# Patient Record
Sex: MALE | Race: WHITE | Employment: OTHER | URBAN - METROPOLITAN AREA
[De-identification: names, ages, dates, MRNs, and addresses within clinical notes are randomized per-mention and may not be internally consistent; named-entity substitution may affect disease eponyms.]

---

## 2023-05-26 ENCOUNTER — OFFICE VISIT (OUTPATIENT)
Dept: ORTHOPEDIC SURGERY | Age: 58
End: 2023-05-26

## 2023-05-26 VITALS
SYSTOLIC BLOOD PRESSURE: 126 MMHG | BODY MASS INDEX: 30.48 KG/M2 | OXYGEN SATURATION: 98 % | DIASTOLIC BLOOD PRESSURE: 72 MMHG | HEART RATE: 70 BPM | TEMPERATURE: 98.6 F | HEIGHT: 72 IN | WEIGHT: 225 LBS

## 2023-05-26 DIAGNOSIS — Z96.651 PAIN DUE TO TOTAL RIGHT KNEE REPLACEMENT, INITIAL ENCOUNTER (HCC): ICD-10-CM

## 2023-05-26 DIAGNOSIS — M17.11 PRIMARY OSTEOARTHRITIS OF RIGHT KNEE: Primary | ICD-10-CM

## 2023-05-26 DIAGNOSIS — T84.84XA PAIN DUE TO TOTAL RIGHT KNEE REPLACEMENT, INITIAL ENCOUNTER (HCC): Primary | ICD-10-CM

## 2023-05-26 DIAGNOSIS — Z96.651 PAIN DUE TO TOTAL RIGHT KNEE REPLACEMENT, INITIAL ENCOUNTER (HCC): Primary | ICD-10-CM

## 2023-05-26 DIAGNOSIS — T84.84XA PAIN DUE TO TOTAL RIGHT KNEE REPLACEMENT, INITIAL ENCOUNTER (HCC): ICD-10-CM

## 2023-05-26 LAB
CRP SERPL HS-MCNC: <3 MG/L (ref 0–5)
ERYTHROCYTE [SEDIMENTATION RATE] IN BLOOD BY WESTERGREN METHOD: 2 MM (ref 0–20)

## 2023-05-26 ASSESSMENT — ENCOUNTER SYMPTOMS
CONSTIPATION: 0
EYE ITCHING: 0
COUGH: 0
EYE PAIN: 0
DIARRHEA: 0
ABDOMINAL PAIN: 0
SHORTNESS OF BREATH: 0
EYE DISCHARGE: 0

## 2023-05-26 NOTE — PROGRESS NOTES
pedis palpable and 2+    Radiographs:  Radiographs of the right knee were personally reviewed, AP, lateral and sunrise and they revealed: The patient has a Carina press-fit total knee in place. It appears to be well fixed and well aligned. There is some slight notching of the anterior femoral cortex. There is some bony overgrowth around the patellar component noted on the sunrise view. There are no fractures. There are no intra-articular loose bodies nor destructive bony lesions noted. MRI: None    Diagnosis:   Diagnosis Orders   1. Pain due to total right knee replacement, initial encounter [facility])            Plan:    The patient has significant instability of the knee. He has severe pain with varus and valgus stressing, especially with releasing of it when the joint clunks back into place. The radiographs do not show any allison evidence of loosening. Concern would be for infection and/or loosening of the implants which would not readily be evident on the plain films. In order to get him some relief, we will get a get him a hinged knee brace. We then going to send him for a sed rate and a CRP to rule out infection. We will send him for a bone scan to evaluate for loosening. Follow-up to discuss findings. No orders of the defined types were placed in this encounter. No orders of the defined types were placed in this encounter. No follow-ups on file.     Emil Brower MD

## 2023-06-06 ENCOUNTER — HOSPITAL ENCOUNTER (OUTPATIENT)
Dept: NUCLEAR MEDICINE | Age: 58
Discharge: HOME | End: 2023-06-08
Attending: ORTHOPAEDIC SURGERY
Payer: COMMERCIAL

## 2023-06-06 PROCEDURE — 78315 BONE IMAGING 3 PHASE: CPT

## 2023-06-06 PROCEDURE — 3430000000 HC RX DIAGNOSTIC RADIOPHARMACEUTICAL: Performed by: ORTHOPAEDIC SURGERY

## 2023-06-06 PROCEDURE — A9503 TC99M MEDRONATE: HCPCS | Performed by: ORTHOPAEDIC SURGERY

## 2023-06-06 RX ORDER — TC 99M MEDRONATE 20 MG/10ML
25 INJECTION, POWDER, LYOPHILIZED, FOR SOLUTION INTRAVENOUS
Status: COMPLETED | OUTPATIENT
Start: 2023-06-06 | End: 2023-06-06

## 2023-06-06 RX ADMIN — TC 99M MEDRONATE 29.4 MILLICURIE: 20 INJECTION, POWDER, LYOPHILIZED, FOR SOLUTION INTRAVENOUS at 07:37

## 2023-07-20 ENCOUNTER — OFFICE VISIT (OUTPATIENT)
Dept: ORTHOPEDIC SURGERY | Age: 58
End: 2023-07-20
Payer: COMMERCIAL

## 2023-07-20 ENCOUNTER — TELEPHONE (OUTPATIENT)
Dept: ORTHOPEDIC SURGERY | Age: 58
End: 2023-07-20

## 2023-07-20 VITALS
HEIGHT: 73 IN | WEIGHT: 195.2 LBS | HEART RATE: 68 BPM | BODY MASS INDEX: 25.87 KG/M2 | DIASTOLIC BLOOD PRESSURE: 88 MMHG | SYSTOLIC BLOOD PRESSURE: 136 MMHG | OXYGEN SATURATION: 99 %

## 2023-07-20 DIAGNOSIS — N30.01 ACUTE CYSTITIS WITH HEMATURIA: ICD-10-CM

## 2023-07-20 DIAGNOSIS — E10.44 DIABETIC AMYOTROPHY ASSOCIATED WITH TYPE 1 DIABETES MELLITUS (HCC): ICD-10-CM

## 2023-07-20 DIAGNOSIS — T84.84XD PAIN DUE TO TOTAL RIGHT KNEE REPLACEMENT, SUBSEQUENT ENCOUNTER: Primary | ICD-10-CM

## 2023-07-20 DIAGNOSIS — D69.6 THIN BLOOD (HCC): ICD-10-CM

## 2023-07-20 DIAGNOSIS — Z96.651 PAIN DUE TO TOTAL RIGHT KNEE REPLACEMENT, SUBSEQUENT ENCOUNTER: Primary | ICD-10-CM

## 2023-07-20 PROCEDURE — 99214 OFFICE O/P EST MOD 30 MIN: CPT | Performed by: ORTHOPAEDIC SURGERY

## 2023-07-20 PROCEDURE — G8427 DOCREV CUR MEDS BY ELIG CLIN: HCPCS | Performed by: ORTHOPAEDIC SURGERY

## 2023-07-20 PROCEDURE — 3046F HEMOGLOBIN A1C LEVEL >9.0%: CPT | Performed by: ORTHOPAEDIC SURGERY

## 2023-07-20 PROCEDURE — 2022F DILAT RTA XM EVC RTNOPTHY: CPT | Performed by: ORTHOPAEDIC SURGERY

## 2023-07-20 PROCEDURE — 4004F PT TOBACCO SCREEN RCVD TLK: CPT | Performed by: ORTHOPAEDIC SURGERY

## 2023-07-20 PROCEDURE — 3017F COLORECTAL CA SCREEN DOC REV: CPT | Performed by: ORTHOPAEDIC SURGERY

## 2023-07-20 PROCEDURE — G8417 CALC BMI ABV UP PARAM F/U: HCPCS | Performed by: ORTHOPAEDIC SURGERY

## 2023-07-20 RX ORDER — CELECOXIB 400 MG/1
CAPSULE ORAL
COMMUNITY
Start: 2023-06-05

## 2023-07-20 RX ORDER — MELOXICAM 15 MG/1
TABLET ORAL
COMMUNITY
Start: 2023-06-01

## 2023-07-20 RX ORDER — GABAPENTIN 300 MG/1
CAPSULE ORAL
COMMUNITY
Start: 2005-03-10

## 2023-07-20 RX ORDER — METAXALONE 800 MG/1
TABLET ORAL
COMMUNITY
Start: 2005-03-10

## 2023-07-20 NOTE — TELEPHONE ENCOUNTER
Patient needs to be schedule for right total knee revision on 8/22/2023 at [facility]. Please call patient to setup.

## 2023-07-20 NOTE — PROGRESS NOTES
ANTIBIOTIC PRE-OP: [] ANCEF 2 gram IVPB if > 120 kg 3 grams IVPB within 1 hour of incision, if ALLERGIC, use VANCOMYCIN 1 gram IV, 2 hours pre-op  [] TXA Protocol [] Other:   [] NPO   [] Betablocker (if needed) _____________________________________   [] Knee high anti-embolic hose [] Thigh high anti-embolic hose   Other: ______________________________________________________    Physician Signature Required:    Date/Time: 7/20/2023

## 2023-07-27 ENCOUNTER — PREP FOR PROCEDURE (OUTPATIENT)
Dept: ORTHOPEDIC SURGERY | Age: 58
End: 2023-07-27

## 2023-07-27 PROBLEM — Z96.651 PAIN DUE TO TOTAL RIGHT KNEE REPLACEMENT (HCC): Status: ACTIVE | Noted: 2023-07-27

## 2023-07-27 PROBLEM — T84.84XA PAIN DUE TO TOTAL RIGHT KNEE REPLACEMENT (HCC): Status: ACTIVE | Noted: 2023-07-27

## 2023-07-28 NOTE — TELEPHONE ENCOUNTER
Shanice Hobson from Dr. [facility] office has spoken with patient in regards to surgery, Patient has stated understanding of all appointments/ times/locations.

## 2023-07-28 NOTE — TELEPHONE ENCOUNTER
LM on patients VM asking patient to contact  [address] office for surgery details. _______________________________________    Surgery: 8/22/2023  [x] [facility] [place]  [] Flora Riya    Provider:   [] Fran Nunez MD  [x] Orelia Mcburney, MD  [] Mohan Umaña MD    Surgery -   THEY WILL CALL THE DAY BEFORE AROUND DINNER TIME TO GIVE THE PATIENT THE TIME TO ARRIVE TO SURGERY.     Pre-op: 8/15/2023 @830am  W/ Amanda Mcmahon PA-C   [x] [address] [facility] [address] [facility] [address], [address] [place] [address] Road  [] [address] [facility], [address] [place] [address] Road  [] [address] [place], [place]  [] [address], [address] [facility] [address]: 8/15/2023 @ 930am  [place]: [address] [facility] [address] Check In at [employer]    Post-op: 9/5/2023 @ 915am W/ Amanda Mcmahon PA-C   [x] [address] [facility] [address] [facility] [address] [facility], [address] [facility] [address]  [] [address] [facility], [address] [place] [address] Road  [] [address] [place], [place]  [] [address] [facility] [address] [place], [place]

## 2023-08-04 ENCOUNTER — OFFICE VISIT (OUTPATIENT)
Dept: ORTHOPEDIC SURGERY | Age: 58
End: 2023-08-04
Payer: COMMERCIAL

## 2023-08-04 VITALS
DIASTOLIC BLOOD PRESSURE: 82 MMHG | HEIGHT: 73 IN | BODY MASS INDEX: 26.51 KG/M2 | HEART RATE: 70 BPM | WEIGHT: 200 LBS | SYSTOLIC BLOOD PRESSURE: 140 MMHG | RESPIRATION RATE: 16 BRPM | OXYGEN SATURATION: 98 %

## 2023-08-04 DIAGNOSIS — Z96.659 PAINFUL TOTAL KNEE REPLACEMENT, SUBSEQUENT ENCOUNTER: ICD-10-CM

## 2023-08-04 DIAGNOSIS — M54.16 LUMBAR RADICULOPATHY: ICD-10-CM

## 2023-08-04 DIAGNOSIS — M16.11 PRIMARY OSTEOARTHRITIS OF RIGHT HIP: Primary | ICD-10-CM

## 2023-08-04 DIAGNOSIS — T84.84XD PAINFUL TOTAL KNEE REPLACEMENT, SUBSEQUENT ENCOUNTER: ICD-10-CM

## 2023-08-04 PROCEDURE — G8427 DOCREV CUR MEDS BY ELIG CLIN: HCPCS | Performed by: ORTHOPAEDIC SURGERY

## 2023-08-04 PROCEDURE — 3017F COLORECTAL CA SCREEN DOC REV: CPT | Performed by: ORTHOPAEDIC SURGERY

## 2023-08-04 PROCEDURE — 4004F PT TOBACCO SCREEN RCVD TLK: CPT | Performed by: ORTHOPAEDIC SURGERY

## 2023-08-04 PROCEDURE — G8417 CALC BMI ABV UP PARAM F/U: HCPCS | Performed by: ORTHOPAEDIC SURGERY

## 2023-08-04 PROCEDURE — 99214 OFFICE O/P EST MOD 30 MIN: CPT | Performed by: ORTHOPAEDIC SURGERY

## 2023-08-04 RX ORDER — PREDNISONE 10 MG/1
10 TABLET ORAL DAILY
Qty: 20 TABLET | Refills: 0 | Status: SHIPPED | OUTPATIENT
Start: 2023-08-04 | End: 2023-08-16

## 2023-08-04 NOTE — PROGRESS NOTES
rotation: 15 degrees  External rotation:  30 degrees  Abduction:  40 degrees  Adduction:  20 degrees  Strength:  abduction 5 and flexion 5  Palpation:  No tenderness  Log roll:  non-painful  Straight leg raise: Negative  Neurovascular status:  sensation intact, moves foot and ankle up and down, moves toes up and down, and 2+ dorsalis pedis    Radiographs:  Radiographs of the right hip were personally reviewed, AP pelvis, AP right hip and frog lateral right hip and they revealed: There is no evidence of any fracture. The patient has very mild degenerative changes of the right hip. He is maintaining a joint space, but he has osteophytes at the femoral head neck junction. He is maintaining a joint space on the left side. There are no intra-articular loose bodies no destructive bony lesions noted. The symphysis pubis and the sacroiliac joints are intact. MRI: None    Diagnosis:     Diagnosis Orders   1. Primary osteoarthritis of right hip  XR HIP 2-3 VW W PELVIS RIGHT      2. Lumbar radiculopathy        3. Painful total knee replacement, subsequent encounter            Plan:    I explained to the patient that he does not have any pain of the hip examination, so I do not believe that this is coming from the hip. I think that he is experiencing a bit of a lumbar radiculopathy, probably from sustained alteration in gait mechanics. We talked about this. In order to get him some relief, were going to go ahead and put him on a steroid taper. I have asked him to take it easy over the weekend. I do think that he has some issues now from chronic limping due to the instability of the knee. The plan will be to settle it down with the steroids and get the knee fixed and get him up and walking normally as soon as possible.     Orders Placed This Encounter   Procedures    XR HIP 2-3 VW W PELVIS RIGHT     Standing Status:   Future     Number of Occurrences:   1     Standing Expiration Date:   8/4/2024     Scheduling

## 2023-08-10 ENCOUNTER — TELEPHONE (OUTPATIENT)
Dept: ORTHOPEDIC SURGERY | Age: 58
End: 2023-08-10

## 2023-08-15 ENCOUNTER — OFFICE VISIT (OUTPATIENT)
Dept: ORTHOPEDIC SURGERY | Age: 58
End: 2023-08-15

## 2023-08-15 ENCOUNTER — HOSPITAL ENCOUNTER (OUTPATIENT)
Dept: PREADMISSION TESTING | Age: 58
Discharge: HOME | End: 2023-08-19
Attending: ORTHOPAEDIC SURGERY
Payer: COMMERCIAL

## 2023-08-15 VITALS
BODY MASS INDEX: 26.93 KG/M2 | WEIGHT: 203.2 LBS | OXYGEN SATURATION: 99 % | SYSTOLIC BLOOD PRESSURE: 139 MMHG | HEIGHT: 73 IN | HEART RATE: 67 BPM | DIASTOLIC BLOOD PRESSURE: 83 MMHG

## 2023-08-15 DIAGNOSIS — N30.01 ACUTE CYSTITIS WITH HEMATURIA: ICD-10-CM

## 2023-08-15 DIAGNOSIS — Z01.818 PREOP EXAMINATION: ICD-10-CM

## 2023-08-15 DIAGNOSIS — Z01.818 PREOP EXAMINATION: Primary | ICD-10-CM

## 2023-08-15 DIAGNOSIS — D69.6 THIN BLOOD (HCC): ICD-10-CM

## 2023-08-15 LAB
ABO + RH BLD: NORMAL
ANION GAP SERPL CALCULATED.3IONS-SCNC: 11 MEQ/L (ref 9–15)
BASOPHILS # BLD: 0 K/UL (ref 0–0.2)
BASOPHILS NFR BLD: 0.3 %
BILIRUB UR QL STRIP: NEGATIVE
BLD GP AB SCN SERPL QL: NORMAL
BUN SERPL-MCNC: 19 MG/DL (ref 6–20)
CALCIUM SERPL-MCNC: 9.9 MG/DL (ref 8.5–9.9)
CHLORIDE SERPL-SCNC: 104 MEQ/L (ref 95–107)
CLARITY UR: CLEAR
CO2 SERPL-SCNC: 24 MEQ/L (ref 20–31)
COLOR UR: YELLOW
CREAT SERPL-MCNC: 1 MG/DL (ref 0.7–1.2)
EOSINOPHIL # BLD: 0 K/UL (ref 0–0.7)
EOSINOPHIL NFR BLD: 0.1 %
ERYTHROCYTE [DISTWIDTH] IN BLOOD BY AUTOMATED COUNT: 13.9 % (ref 11.5–14.5)
GLUCOSE SERPL-MCNC: 115 MG/DL (ref 70–99)
GLUCOSE UR STRIP-MCNC: NEGATIVE MG/DL
HBA1C MFR BLD: 5.2 % (ref 4.8–5.9)
HCT VFR BLD AUTO: 44.6 % (ref 42–52)
HGB BLD-MCNC: 15.1 G/DL (ref 14–18)
HGB UR QL STRIP: NEGATIVE
KETONES UR STRIP-MCNC: NEGATIVE MG/DL
LEUKOCYTE ESTERASE UR QL STRIP: NEGATIVE
LYMPHOCYTES # BLD: 0.8 K/UL (ref 1–4.8)
LYMPHOCYTES NFR BLD: 5.9 %
MCH RBC QN AUTO: 30.4 PG (ref 27–31.3)
MCHC RBC AUTO-ENTMCNC: 33.9 % (ref 33–37)
MCV RBC AUTO: 89.5 FL (ref 79–92.2)
MONOCYTES # BLD: 0.3 K/UL (ref 0.2–0.8)
MONOCYTES NFR BLD: 2.2 %
NEUTROPHILS # BLD: 12.8 K/UL (ref 1.4–6.5)
NEUTS SEG NFR BLD: 91.5 %
NITRITE UR QL STRIP: NEGATIVE
PH UR STRIP: 5.5 [PH] (ref 5–9)
PLATELET # BLD AUTO: 264 K/UL (ref 130–400)
POTASSIUM SERPL-SCNC: 4.5 MEQ/L (ref 3.4–4.9)
PROT UR STRIP-MCNC: NEGATIVE MG/DL
RBC # BLD AUTO: 4.98 M/UL (ref 4.7–6.1)
SODIUM SERPL-SCNC: 139 MEQ/L (ref 135–144)
SP GR UR STRIP: 1.01 (ref 1–1.03)
UROBILINOGEN UR STRIP-ACNC: 0.2 E.U./DL
WBC # BLD AUTO: 14 K/UL (ref 4.8–10.8)

## 2023-08-15 PROCEDURE — 81003 URINALYSIS AUTO W/O SCOPE: CPT

## 2023-08-15 PROCEDURE — 87086 URINE CULTURE/COLONY COUNT: CPT

## 2023-08-15 PROCEDURE — 80048 BASIC METABOLIC PNL TOTAL CA: CPT

## 2023-08-15 PROCEDURE — 86901 BLOOD TYPING SEROLOGIC RH(D): CPT

## 2023-08-15 PROCEDURE — 83036 HEMOGLOBIN GLYCOSYLATED A1C: CPT

## 2023-08-15 PROCEDURE — 86850 RBC ANTIBODY SCREEN: CPT

## 2023-08-15 PROCEDURE — 86900 BLOOD TYPING SEROLOGIC ABO: CPT

## 2023-08-15 PROCEDURE — 87641 MR-STAPH DNA AMP PROBE: CPT

## 2023-08-15 PROCEDURE — 85025 COMPLETE CBC W/AUTO DIFF WBC: CPT

## 2023-08-15 RX ORDER — SODIUM CHLORIDE 0.9 % (FLUSH) 0.9 %
5-40 SYRINGE (ML) INJECTION EVERY 12 HOURS SCHEDULED
OUTPATIENT
Start: 2023-08-15

## 2023-08-15 RX ORDER — CHLORHEXIDINE GLUCONATE 213 G/1000ML
SOLUTION TOPICAL
Qty: 118 ML | Refills: 0 | Status: SHIPPED | OUTPATIENT
Start: 2023-08-15

## 2023-08-15 RX ORDER — CLONIDINE HYDROCHLORIDE 0.1 MG/1
0.1 TABLET ORAL NIGHTLY
COMMUNITY
Start: 2023-08-02

## 2023-08-15 RX ORDER — SODIUM CHLORIDE 0.9 % (FLUSH) 0.9 %
5-40 SYRINGE (ML) INJECTION PRN
OUTPATIENT
Start: 2023-08-15

## 2023-08-15 RX ORDER — SODIUM CHLORIDE, SODIUM LACTATE, POTASSIUM CHLORIDE, CALCIUM CHLORIDE 600; 310; 30; 20 MG/100ML; MG/100ML; MG/100ML; MG/100ML
INJECTION, SOLUTION INTRAVENOUS CONTINUOUS
OUTPATIENT
Start: 2023-08-15

## 2023-08-15 RX ORDER — SODIUM CHLORIDE 9 MG/ML
INJECTION, SOLUTION INTRAVENOUS PRN
OUTPATIENT
Start: 2023-08-15

## 2023-08-15 NOTE — PATIENT INSTRUCTIONS
-please ensure that blood work is done at least 3 days before your surgery at the preadmission testing site (located at surgery check in - [address] [place] [address] - entrance B)    -stop taking asprin, celebrex, mobic, motrin until after your surgery. All blood thinners need to be stopped at least 5 days in advance prior to surgery. If you experiencing pain, the only medication you can take for that is tylenol 3-4x / day not to exceed 4000 mg.    -Hibiclens was sent to your pharmacy to . Please follow the instructions provided with the prescription and use daily starting 5 days before surgery.     -no eating / drinking the after midnight the night before surgery.  Sips of water the morning of for all other medications is OK    -our surgery department will reach out the you the day before your surgery and let you know what time to arrive at the [address] [place] [address] (door B, same place as where you got blood work)    If you have any questions, please call our office and I will be happy to answer them

## 2023-08-15 NOTE — PROGRESS NOTES
[facility]    H&P: Preadmission Testing     Patient: Carolyn Osborn  YOB: 1965  MRN: [id number]    Subjective:     Chief Complaint   Patient presents with    Pre-op Exam     Pt presents for his pre-op exam       HPI: Carolyn Osborn is a 63 y.o. maleis here for right knee revision to be performed by Dr. Maxwell Tavarez. This is a preadmission consultation requested by the surgeon themself. There is no known history of heart disease or infarction. There is no recent chest pain or discomfort, palpitations, shortness of breath, DAIGLE, difficulties with exercise, bilateral ankle swelling. Not taking any blood thinners. There is no known history of obstructive or restrictive lung disease. Former smoker. No recent wheezing or use of any kind of inhalers. No recent hospitalizations for any lung-related illnesses. No recent Covid infection. No known history of gastric issues which includes ulcers, herniations, bariatric surgeries. No recent GI bleeds    No known history of hyper or hypercoagulable states. They are not diabetic. They have normal kidney function. Past Medical History:    No past medical history on file. Past Surgical History:    No past surgical history on file. Medications Prior to Admission:    Current Outpatient Medications   Medication Sig Dispense Refill    predniSONE (DELTASONE) 10 MG tablet Take 1 tablet by mouth daily for 12 days Cannot change sig - take 30 mg daily for three days; then 20 mg daily for three days; then 10 mg daily for three days; then 5 mg daily for three days 20 tablet 0    celecoxib (CELEBREX) 400 MG capsule       gabapentin (NEURONTIN) 300 MG capsule Take by mouth.      meloxicam (MOBIC) 15 MG tablet       metaxalone (SKELAXIN) 800 MG tablet Take by mouth      Misc. Devices (BATH/SHOWER SEAT) MISC Dispense 1 Shower/Bath seat 1 each 0    Misc.  Devices (CLASSICS ROLLING WALKER) MISC Dispense 1 rolling walker 1 each

## 2023-08-16 LAB — BACTERIA UR CULT: NORMAL

## 2023-08-17 LAB
MRSA, DNA, NASAL: NEGATIVE
SPECIMEN DESCRIPTION: NORMAL

## 2023-08-18 NOTE — DISCHARGE INSTRUCTIONS
[facility]    Orthopedic care:  MD Morgan Layne PA-C    Post Operative Instructions for Total Knee Revision    Incision and dressing  Your incision is covered with a waterproof Aquacel dressing. It has been impregnated with silver nitrate to help rivera off infection. The dressing is to be removed 7 days after the date of your surgery. The incision has been closed with skin glue. The glue will flake off over time and fall off on its own. DO NOT apply any lotions, creams, peroxide or Betadine to the skin glue, as it will degrade and dissolve the glue. DO NOT peel the glue off, as this could result in opening of the incision. There are no sutures that need to be removed; the skin and subcutaneous layers have been closed with dissolvable sutures, which will dissolve over 7 to 8 weeks. Showering  The Aquacel dressing is waterproof. You may shower when you feel ready. Once the Aquacel dressing has been removed, you may continue to shower. The glue provides a waterproof seal to the incision. Let the water run over the incision, and pat dry with a towel. Do not scrub or rub the glue. Compression stockings  The compression stockings/VA hose are used to help reduce swelling and assist in the prevention of blood clots. They are to be worn on the operative leg for 3 weeks. They should be worn full-time during the day, but may be removed at nighttime or during periods of elevation during the day. They are optional on the nonoperative leg, depending on how much swelling may be encountered. Activity  You will begin activity immediately after surgery. Physical therapy will commence (at home or as an outpatient) within a few days of surgery. You may bear weight on the operative leg as tolerated. It is encouraged that you get up for short walks frequently throughout the day.   Pump your ankles up and down at least 10 times every hour softener such as Colace or Dulcolax as recommended his pain medications can lead to constipation. Take this as needed until your bowel function is normal. Nausea is also a common side effect of narcotics, for this Zofran was sent to your pharmacy. It is not atypical to have burning and tightening pain sensations in the area around the surgery and this is likely related to muscle spasms. A prescription of Zanaflex was sent to your pharmacy and he can take this as needed. Please note that this medication can make you drowsy, especially when taking narcotics. You can safely resume all other medications regularly prescribed by your other doctors. Follow-up  You will have a follow-up appointment with Ricky Newsome, or Dr. Simona Kitchen approximately 3 weeks after the date of your surgery. CALL THE OFFICE ([phone]) if:  You run a fever of 100 degrees or higher that will not subside with Tylenol.   You noticed drainage from the incision  You experience any significant calf tenderness associated with redness, worsening swelling, shortness of breath  You have pain that is not relieved by rest or medication that prohibits you from walking

## 2023-08-22 ENCOUNTER — APPOINTMENT (OUTPATIENT)
Dept: GENERAL RADIOLOGY | Age: 58
End: 2023-08-22
Attending: ORTHOPAEDIC SURGERY
Payer: COMMERCIAL

## 2023-08-22 ENCOUNTER — HOSPITAL ENCOUNTER (OUTPATIENT)
Age: 58
Setting detail: OBSERVATION
Discharge: HOME HEALTH | End: 2023-08-23
Attending: ORTHOPAEDIC SURGERY | Admitting: ORTHOPAEDIC SURGERY
Payer: COMMERCIAL

## 2023-08-22 ENCOUNTER — ANESTHESIA (OUTPATIENT)
Dept: OPERATING ROOM | Age: 58
End: 2023-08-22
Payer: COMMERCIAL

## 2023-08-22 ENCOUNTER — ANESTHESIA EVENT (OUTPATIENT)
Dept: OPERATING ROOM | Age: 58
End: 2023-08-22
Payer: COMMERCIAL

## 2023-08-22 DIAGNOSIS — Z96.651 S/P REVISION OF TOTAL KNEE, RIGHT: Primary | ICD-10-CM

## 2023-08-22 PROBLEM — T84.84XA PAIN DUE TO TOTAL RIGHT KNEE REPLACEMENT, INITIAL ENCOUNTER (HCC): Status: ACTIVE | Noted: 2023-08-22

## 2023-08-22 PROCEDURE — 3600000014 HC SURGERY LEVEL 4 ADDTL 15MIN: Performed by: ORTHOPAEDIC SURGERY

## 2023-08-22 PROCEDURE — 3600000004 HC SURGERY LEVEL 4 BASE: Performed by: ORTHOPAEDIC SURGERY

## 2023-08-22 PROCEDURE — 2580000003 HC RX 258: Performed by: PHYSICIAN ASSISTANT

## 2023-08-22 PROCEDURE — 94150 VITAL CAPACITY TEST: CPT

## 2023-08-22 PROCEDURE — 6370000000 HC RX 637 (ALT 250 FOR IP): Performed by: PHYSICIAN ASSISTANT

## 2023-08-22 PROCEDURE — 7100000000 HC PACU RECOVERY - FIRST 15 MIN: Performed by: ORTHOPAEDIC SURGERY

## 2023-08-22 PROCEDURE — 6360000002 HC RX W HCPCS: Performed by: PHYSICIAN ASSISTANT

## 2023-08-22 PROCEDURE — 6360000002 HC RX W HCPCS: Performed by: ORTHOPAEDIC SURGERY

## 2023-08-22 PROCEDURE — 73560 X-RAY EXAM OF KNEE 1 OR 2: CPT

## 2023-08-22 PROCEDURE — 2500000003 HC RX 250 WO HCPCS: Performed by: PHYSICIAN ASSISTANT

## 2023-08-22 PROCEDURE — 27846 TREAT ANKLE DISLOCATION: CPT | Performed by: ORTHOPAEDIC SURGERY

## 2023-08-22 PROCEDURE — 6360000002 HC RX W HCPCS: Performed by: ANESTHESIOLOGY

## 2023-08-22 PROCEDURE — C1776 JOINT DEVICE (IMPLANTABLE): HCPCS | Performed by: ORTHOPAEDIC SURGERY

## 2023-08-22 PROCEDURE — 3700000000 HC ANESTHESIA ATTENDED CARE: Performed by: ORTHOPAEDIC SURGERY

## 2023-08-22 PROCEDURE — 2580000003 HC RX 258: Performed by: ORTHOPAEDIC SURGERY

## 2023-08-22 PROCEDURE — 3700000001 HC ADD 15 MINUTES (ANESTHESIA): Performed by: ORTHOPAEDIC SURGERY

## 2023-08-22 PROCEDURE — 6360000002 HC RX W HCPCS: Performed by: REGISTERED NURSE

## 2023-08-22 PROCEDURE — 64447 NJX AA&/STRD FEMORAL NRV IMG: CPT | Performed by: REGISTERED NURSE

## 2023-08-22 PROCEDURE — 27486 REVISE/REPLACE KNEE JOINT: CPT | Performed by: ORTHOPAEDIC SURGERY

## 2023-08-22 PROCEDURE — 7100000001 HC PACU RECOVERY - ADDTL 15 MIN: Performed by: ORTHOPAEDIC SURGERY

## 2023-08-22 PROCEDURE — 2709999900 HC NON-CHARGEABLE SUPPLY: Performed by: ORTHOPAEDIC SURGERY

## 2023-08-22 PROCEDURE — G0378 HOSPITAL OBSERVATION PER HR: HCPCS

## 2023-08-22 DEVICE — COMPONENT ARTC SURF CR 7-10 UNIV 51X82X14 MM CONSTRN BLU: Type: IMPLANTABLE DEVICE | Site: KNEE | Status: FUNCTIONAL

## 2023-08-22 RX ORDER — CELECOXIB 200 MG/1
200 CAPSULE ORAL ONCE
Status: COMPLETED | OUTPATIENT
Start: 2023-08-22 | End: 2023-08-22

## 2023-08-22 RX ORDER — MORPHINE SULFATE 2 MG/ML
2 INJECTION, SOLUTION INTRAMUSCULAR; INTRAVENOUS
Status: DISCONTINUED | OUTPATIENT
Start: 2023-08-22 | End: 2023-08-23 | Stop reason: HOSPADM

## 2023-08-22 RX ORDER — SODIUM CHLORIDE 0.9 % (FLUSH) 0.9 %
5-40 SYRINGE (ML) INJECTION EVERY 12 HOURS SCHEDULED
Status: DISCONTINUED | OUTPATIENT
Start: 2023-08-22 | End: 2023-08-22 | Stop reason: HOSPADM

## 2023-08-22 RX ORDER — ACETAMINOPHEN 325 MG/1
650 TABLET ORAL EVERY 6 HOURS
Status: DISCONTINUED | OUTPATIENT
Start: 2023-08-22 | End: 2023-08-23 | Stop reason: HOSPADM

## 2023-08-22 RX ORDER — SODIUM CHLORIDE 0.9 % (FLUSH) 0.9 %
5-40 SYRINGE (ML) INJECTION PRN
Status: DISCONTINUED | OUTPATIENT
Start: 2023-08-22 | End: 2023-08-22 | Stop reason: HOSPADM

## 2023-08-22 RX ORDER — ONDANSETRON 2 MG/ML
4 INJECTION INTRAMUSCULAR; INTRAVENOUS
Status: DISCONTINUED | OUTPATIENT
Start: 2023-08-22 | End: 2023-08-22 | Stop reason: HOSPADM

## 2023-08-22 RX ORDER — SODIUM CHLORIDE 0.9 % (FLUSH) 0.9 %
5-40 SYRINGE (ML) INJECTION PRN
Status: DISCONTINUED | OUTPATIENT
Start: 2023-08-22 | End: 2023-08-23 | Stop reason: HOSPADM

## 2023-08-22 RX ORDER — SENNA AND DOCUSATE SODIUM 50; 8.6 MG/1; MG/1
1 TABLET, FILM COATED ORAL 2 TIMES DAILY
Status: DISCONTINUED | OUTPATIENT
Start: 2023-08-22 | End: 2023-08-23 | Stop reason: HOSPADM

## 2023-08-22 RX ORDER — SODIUM CHLORIDE 0.9 % (FLUSH) 0.9 %
5-40 SYRINGE (ML) INJECTION EVERY 12 HOURS SCHEDULED
Status: DISCONTINUED | OUTPATIENT
Start: 2023-08-22 | End: 2023-08-23 | Stop reason: HOSPADM

## 2023-08-22 RX ORDER — MAGNESIUM HYDROXIDE 1200 MG/15ML
LIQUID ORAL CONTINUOUS PRN
Status: COMPLETED | OUTPATIENT
Start: 2023-08-22 | End: 2023-08-22

## 2023-08-22 RX ORDER — SODIUM CHLORIDE 9 MG/ML
INJECTION, SOLUTION INTRAVENOUS PRN
Status: DISCONTINUED | OUTPATIENT
Start: 2023-08-22 | End: 2023-08-22 | Stop reason: HOSPADM

## 2023-08-22 RX ORDER — ASPIRIN 81 MG/1
81 TABLET ORAL 2 TIMES DAILY
Status: DISCONTINUED | OUTPATIENT
Start: 2023-08-22 | End: 2023-08-23 | Stop reason: HOSPADM

## 2023-08-22 RX ORDER — SODIUM CHLORIDE 9 MG/ML
INJECTION, SOLUTION INTRAVENOUS PRN
Status: DISCONTINUED | OUTPATIENT
Start: 2023-08-22 | End: 2023-08-23 | Stop reason: HOSPADM

## 2023-08-22 RX ORDER — KETOROLAC TROMETHAMINE 30 MG/ML
30 INJECTION, SOLUTION INTRAMUSCULAR; INTRAVENOUS EVERY 6 HOURS
Status: COMPLETED | OUTPATIENT
Start: 2023-08-22 | End: 2023-08-23

## 2023-08-22 RX ORDER — GABAPENTIN 100 MG/1
100 CAPSULE ORAL ONCE
Status: COMPLETED | OUTPATIENT
Start: 2023-08-22 | End: 2023-08-22

## 2023-08-22 RX ORDER — OXYCODONE HYDROCHLORIDE 5 MG/1
10 TABLET ORAL EVERY 4 HOURS PRN
Status: DISCONTINUED | OUTPATIENT
Start: 2023-08-22 | End: 2023-08-23 | Stop reason: HOSPADM

## 2023-08-22 RX ORDER — POLYETHYLENE GLYCOL 3350 17 G/17G
17 POWDER, FOR SOLUTION ORAL DAILY PRN
Status: DISCONTINUED | OUTPATIENT
Start: 2023-08-22 | End: 2023-08-23 | Stop reason: HOSPADM

## 2023-08-22 RX ORDER — METOCLOPRAMIDE HYDROCHLORIDE 5 MG/ML
10 INJECTION INTRAMUSCULAR; INTRAVENOUS
Status: DISCONTINUED | OUTPATIENT
Start: 2023-08-22 | End: 2023-08-22 | Stop reason: HOSPADM

## 2023-08-22 RX ORDER — OXYCODONE HYDROCHLORIDE 5 MG/1
5 TABLET ORAL EVERY 4 HOURS PRN
Status: DISCONTINUED | OUTPATIENT
Start: 2023-08-22 | End: 2023-08-23 | Stop reason: HOSPADM

## 2023-08-22 RX ORDER — MIDAZOLAM HYDROCHLORIDE 1 MG/ML
INJECTION INTRAMUSCULAR; INTRAVENOUS PRN
Status: DISCONTINUED | OUTPATIENT
Start: 2023-08-22 | End: 2023-08-22 | Stop reason: SDUPTHER

## 2023-08-22 RX ORDER — FENTANYL CITRATE 0.05 MG/ML
50 INJECTION, SOLUTION INTRAMUSCULAR; INTRAVENOUS EVERY 10 MIN PRN
Status: DISCONTINUED | OUTPATIENT
Start: 2023-08-22 | End: 2023-08-22 | Stop reason: HOSPADM

## 2023-08-22 RX ORDER — ONDANSETRON 4 MG/1
4 TABLET, ORALLY DISINTEGRATING ORAL EVERY 8 HOURS PRN
Status: DISCONTINUED | OUTPATIENT
Start: 2023-08-22 | End: 2023-08-23 | Stop reason: HOSPADM

## 2023-08-22 RX ORDER — OXYCODONE HCL 10 MG/1
10 TABLET, FILM COATED, EXTENDED RELEASE ORAL ONCE
Status: COMPLETED | OUTPATIENT
Start: 2023-08-22 | End: 2023-08-22

## 2023-08-22 RX ORDER — DIPHENHYDRAMINE HYDROCHLORIDE 50 MG/ML
12.5 INJECTION INTRAMUSCULAR; INTRAVENOUS
Status: DISCONTINUED | OUTPATIENT
Start: 2023-08-22 | End: 2023-08-22 | Stop reason: HOSPADM

## 2023-08-22 RX ORDER — SCOLOPAMINE TRANSDERMAL SYSTEM 1 MG/1
1 PATCH, EXTENDED RELEASE TRANSDERMAL ONCE
Status: DISCONTINUED | OUTPATIENT
Start: 2023-08-22 | End: 2023-08-23 | Stop reason: HOSPADM

## 2023-08-22 RX ORDER — MEPERIDINE HYDROCHLORIDE 25 MG/ML
12.5 INJECTION INTRAMUSCULAR; INTRAVENOUS; SUBCUTANEOUS
Status: COMPLETED | OUTPATIENT
Start: 2023-08-22 | End: 2023-08-22

## 2023-08-22 RX ORDER — OXYCODONE HYDROCHLORIDE 5 MG/1
5 TABLET ORAL
Status: DISCONTINUED | OUTPATIENT
Start: 2023-08-22 | End: 2023-08-22 | Stop reason: HOSPADM

## 2023-08-22 RX ORDER — SODIUM CHLORIDE, SODIUM LACTATE, POTASSIUM CHLORIDE, CALCIUM CHLORIDE 600; 310; 30; 20 MG/100ML; MG/100ML; MG/100ML; MG/100ML
INJECTION, SOLUTION INTRAVENOUS CONTINUOUS
Status: DISPENSED | OUTPATIENT
Start: 2023-08-22 | End: 2023-08-23

## 2023-08-22 RX ORDER — SODIUM CHLORIDE, SODIUM LACTATE, POTASSIUM CHLORIDE, CALCIUM CHLORIDE 600; 310; 30; 20 MG/100ML; MG/100ML; MG/100ML; MG/100ML
INJECTION, SOLUTION INTRAVENOUS CONTINUOUS
Status: DISCONTINUED | OUTPATIENT
Start: 2023-08-22 | End: 2023-08-22 | Stop reason: HOSPADM

## 2023-08-22 RX ORDER — ROPIVACAINE HYDROCHLORIDE 5 MG/ML
INJECTION, SOLUTION EPIDURAL; INFILTRATION; PERINEURAL PRN
Status: DISCONTINUED | OUTPATIENT
Start: 2023-08-22 | End: 2023-08-22 | Stop reason: SDUPTHER

## 2023-08-22 RX ORDER — BUPIVACAINE HYDROCHLORIDE 7.5 MG/ML
INJECTION, SOLUTION INTRASPINAL
Status: COMPLETED | OUTPATIENT
Start: 2023-08-22 | End: 2023-08-22

## 2023-08-22 RX ORDER — ONDANSETRON 2 MG/ML
4 INJECTION INTRAMUSCULAR; INTRAVENOUS EVERY 6 HOURS PRN
Status: DISCONTINUED | OUTPATIENT
Start: 2023-08-22 | End: 2023-08-23 | Stop reason: HOSPADM

## 2023-08-22 RX ORDER — ACETAMINOPHEN 500 MG
1000 TABLET ORAL ONCE
Status: COMPLETED | OUTPATIENT
Start: 2023-08-22 | End: 2023-08-22

## 2023-08-22 RX ORDER — PROPOFOL 10 MG/ML
INJECTION, EMULSION INTRAVENOUS PRN
Status: DISCONTINUED | OUTPATIENT
Start: 2023-08-22 | End: 2023-08-22 | Stop reason: SDUPTHER

## 2023-08-22 RX ADMIN — KETOROLAC TROMETHAMINE 30 MG: 30 INJECTION, SOLUTION INTRAMUSCULAR; INTRAVENOUS at 17:27

## 2023-08-22 RX ADMIN — Medication 5 ML: at 21:00

## 2023-08-22 RX ADMIN — ROPIVACAINE HYDROCHLORIDE 30 ML: 5 INJECTION EPIDURAL; INFILTRATION; PERINEURAL at 12:32

## 2023-08-22 RX ADMIN — CELECOXIB 200 MG: 200 CAPSULE ORAL at 11:07

## 2023-08-22 RX ADMIN — GABAPENTIN 100 MG: 100 CAPSULE ORAL at 11:07

## 2023-08-22 RX ADMIN — MORPHINE SULFATE 2 MG: 2 INJECTION, SOLUTION INTRAMUSCULAR; INTRAVENOUS at 20:06

## 2023-08-22 RX ADMIN — PROPOFOL 100 MCG/KG/MIN: 10 INJECTION, EMULSION INTRAVENOUS at 13:10

## 2023-08-22 RX ADMIN — ACETAMINOPHEN 1000 MG: 500 TABLET ORAL at 11:07

## 2023-08-22 RX ADMIN — SENNOSIDES AND DOCUSATE SODIUM 1 TABLET: 50; 8.6 TABLET ORAL at 20:09

## 2023-08-22 RX ADMIN — BUPIVACAINE HYDROCHLORIDE IN DEXTROSE 15 MG: 7.5 INJECTION, SOLUTION SUBARACHNOID at 12:40

## 2023-08-22 RX ADMIN — ASPIRIN 81 MG: 81 TABLET, COATED ORAL at 20:09

## 2023-08-22 RX ADMIN — TRANEXAMIC ACID 1000 MG: 100 INJECTION, SOLUTION INTRAVENOUS at 13:32

## 2023-08-22 RX ADMIN — CEFAZOLIN 2000 MG: 2 INJECTION, POWDER, FOR SOLUTION INTRAMUSCULAR; INTRAVENOUS at 13:32

## 2023-08-22 RX ADMIN — SODIUM CHLORIDE: 9 INJECTION, SOLUTION INTRAVENOUS at 11:43

## 2023-08-22 RX ADMIN — OXYCODONE HYDROCHLORIDE 10 MG: 10 TABLET, FILM COATED, EXTENDED RELEASE ORAL at 11:07

## 2023-08-22 RX ADMIN — MEPERIDINE HYDROCHLORIDE 12.5 MG: 25 INJECTION, SOLUTION INTRAMUSCULAR; INTRAVENOUS; SUBCUTANEOUS at 15:41

## 2023-08-22 RX ADMIN — PROPOFOL 50 MG: 10 INJECTION, EMULSION INTRAVENOUS at 13:33

## 2023-08-22 RX ADMIN — OXYCODONE HYDROCHLORIDE 10 MG: 5 TABLET ORAL at 17:28

## 2023-08-22 RX ADMIN — SODIUM CHLORIDE, POTASSIUM CHLORIDE, SODIUM LACTATE AND CALCIUM CHLORIDE: 600; 310; 30; 20 INJECTION, SOLUTION INTRAVENOUS at 13:32

## 2023-08-22 RX ADMIN — ACETAMINOPHEN 650 MG: 325 TABLET ORAL at 23:13

## 2023-08-22 RX ADMIN — ONDANSETRON 4 MG: 2 INJECTION INTRAMUSCULAR; INTRAVENOUS at 17:27

## 2023-08-22 RX ADMIN — KETOROLAC TROMETHAMINE 30 MG: 30 INJECTION, SOLUTION INTRAMUSCULAR; INTRAVENOUS at 23:13

## 2023-08-22 RX ADMIN — CEFAZOLIN 2000 MG: 2 INJECTION, POWDER, FOR SOLUTION INTRAMUSCULAR; INTRAVENOUS at 21:43

## 2023-08-22 RX ADMIN — ACETAMINOPHEN 650 MG: 325 TABLET ORAL at 17:28

## 2023-08-22 RX ADMIN — PROPOFOL 100 MG: 10 INJECTION, EMULSION INTRAVENOUS at 13:08

## 2023-08-22 RX ADMIN — TRANEXAMIC ACID 1000 MG: 100 INJECTION, SOLUTION INTRAVENOUS at 14:08

## 2023-08-22 RX ADMIN — MIDAZOLAM HYDROCHLORIDE 2 MG: 1 INJECTION, SOLUTION INTRAMUSCULAR; INTRAVENOUS at 12:30

## 2023-08-22 ASSESSMENT — PAIN SCALES - GENERAL
PAINLEVEL_OUTOF10: 0
PAINLEVEL_OUTOF10: 3
PAINLEVEL_OUTOF10: 0
PAINLEVEL_OUTOF10: 5
PAINLEVEL_OUTOF10: 10
PAINLEVEL_OUTOF10: 7
PAINLEVEL_OUTOF10: 0
PAINLEVEL_OUTOF10: 0

## 2023-08-22 ASSESSMENT — PAIN DESCRIPTION - ORIENTATION
ORIENTATION: RIGHT

## 2023-08-22 ASSESSMENT — PAIN DESCRIPTION - LOCATION
LOCATION: KNEE

## 2023-08-22 ASSESSMENT — PAIN DESCRIPTION - FREQUENCY: FREQUENCY: CONTINUOUS

## 2023-08-22 ASSESSMENT — PAIN DESCRIPTION - DESCRIPTORS: DESCRIPTORS: BURNING;THROBBING

## 2023-08-22 NOTE — OP NOTE
Operative Note      Patient: Anabell Medrano  YOB: 1965  MRN: [id number]    Date of Procedure: 8/22/2023    Pre-Op Diagnosis Codes:     * Pain due to total right knee replacement, subsequent encounter [T84.84XD, Z96.651]    Post-Op Diagnosis: Same       Procedure(s):  Right total knee revision,Carina polyethylene; Carina persona revision knee system. ,Spinal. Romulo Cruz / Bijan Angel -polyethylene exchange    Surgeon(s):  Khadijah Hein MD    Assistant:   First Assistant: Jo Gonzalez  Physician Assistant: ERIK Pinto's assistance consisted of assistance with positioning of the limb, retraction and closing the wound    Anesthesia: Spinal    Estimated Blood Loss (mL): less than 50     Complications: None    Specimens:   * No specimens in log *    Implants:  Implant Name Type Inv. Item Serial No.  Lot No. LRB No. Used Action   COMPONENT [facility] [id number] MM Christella Forget - [id number]  COMPONENT [facility] 7[facility] [id number] MM [employer]ryl [employer]- [id number] Right 1 Implanted         Drains: * No LDAs found *    Findings: Global ligamentous laxity that went well with increasing the polyethylene liner; posterior femoral overgrowth over the posterior femoral condyles        Detailed Description of Procedure:   Patient was brought to the operating room. After adequate duction of anesthesia by anesthesiology patient was put supine on the operating table. Tourniquet was applied to right upper thigh. The right lower extremities were prepped and draped in sterile fashion with ChloraPrep scrub. Limb was exsanguinated and the tourniquet was insufflated. Prior to making the incision, the knee was examined. The patient had significant varus valgus laxity throughout the entire arc of motion. It was the same in flexion as it was in extension. The anterior curvilinear incision was opened up.   Sharp dissection through skin subcutaneous tissue down to level the extensor mechanism. Medial parapatellar arthrotomy was then performed. It was noted that the patient had a diffuse synovitis. Appropriate soft tissue releases were performed. Partial synovectomy was performed. The knee was then flexed up. Utilizing an osteotome and a mallet, the polyethylene liner was removed from the tibial tray. The femoral component and the tibial component were assessed for stability. They were well fixed. No evidence of any loosening. The polyethylene that was removed was a size 10. Decision was made to trial as well. On starting the trial then, resistance was encountered posteriorly. It was then noted that the patient had bony overgrowth over top of the posterior femoral condyle. This was taken down with an osteotome and a mallet. All of the bone fragments were removed. Once this was done, the knee was trialed with a 12 mm and then a 14 mm anterior lipped polyethylene liner. The 14 eliminated all of the laxity that was noted with a 10 mm polyethylene liner in place. There is no varus or valgus laxity throughout the entire arc of motion. The knee was stable. This essentially confirmed that the flexion and extension gaps were equalized. The decision was made to proceed with a polyethylene exchange. The polyethylene liner was removed from the knee. The tourniquet was let down. Hemostasis was obtained with Bovie electrocautery. The pain cocktail was injected into the soft tissues. The knee was then copiously irrigated out with normal saline from the pulse. Retractors were placed around the proximal tibia and a 14 mm anterior polyethylene liner was then impacted in place and ligating the locking mechanism. The knee continue to extend fully and flex well. Again, there was no varus or valgus laxity throughout the entire arc of motion. The knee was stable.   The decision was made to proceed with this rather than full revision, because it was bleeding again in terms of

## 2023-08-22 NOTE — ANESTHESIA PROCEDURE NOTES
Spinal Block    Patient location during procedure: pre-op  Reason for block: primary anesthetic  Staffing  Performed: anesthesiologist   Anesthesiologist: Miranda Correa MD  Spinal Block  Patient position: sitting  Prep: Betadine  Patient monitoring: cardiac monitor, continuous pulse ox and frequent blood pressure checks  Approach: midline  Location: L4/L5  Provider prep: mask and sterile gloves  Local infiltration: lidocaine  Needle  Needle type: Pencan   Needle gauge: 24 G  Assessment  Events: cerebrospinal fluid  Swirl obtained: Yes  CSF: clear  Attempts: 1  Hemodynamics: stable  Preanesthetic Checklist  Completed: patient identified, IV checked, site marked, risks and benefits discussed, surgical/procedural consents, equipment checked, pre-op evaluation, timeout performed, anesthesia consent given, oxygen available, monitors applied/VS acknowledged, fire risk safety assessment completed and verbalized and blood product R/B/A discussed and consented

## 2023-08-22 NOTE — H&P
The history and physical in the electronic medical record dated 8/15/23 was personally reviewed. There are no interval changes that need to be made. Plan is to proceed with a right knee revision as scheduled. The patient is opted to proceed with a knee replacement surgery. I brought the model in, and we sat down and talked about surgery. We talked about the recovery. I stressed the importance of physical therapy and active participation in physical therapy to the patient in order to achieve a satisfactory postoperative outcome. We went over the risks of surgery. Risks include, but are not limited to, infection, neurovascular injury, hematoma formation, wound healing complications, blood clot, persistent postoperative pain, ligament injury, and risks of anesthesia. The patient expressed understanding and wishes to proceed with a total knee replacement as above.

## 2023-08-22 NOTE — ANESTHESIA POSTPROCEDURE EVALUATION
Department of Anesthesiology  Postprocedure Note    Patient: Domenic Osorio  MRN: [id number]  YOB: 1965  Date of evaluation: 8/22/2023      Procedure Summary     Date: 08/22/23 Room / Location: [address]    Anesthesia Start: 2562 Anesthesia Stop: 6869    Procedure: Right total knee revision,Carina polyethylene; Carina persona revision knee system. ,Spinal. EDGAR / Martínez Aguayo (Right) Diagnosis:       Pain due to total right knee replacement, subsequent encounter      (Pain due to total right knee replacement, subsequent encounter [H29.68RZ, Z96.651])    Surgeons: Clarke Pena MD Responsible Provider: DENNIS Hong CRNA    Anesthesia Type: spinal ASA Status: 2          Anesthesia Type: No value filed.     Sarah Phase I: Sarah Score: 10    Sarah Phase II:        Anesthesia Post Evaluation    Patient location during evaluation: bedside  Patient participation: complete - patient participated  Level of consciousness: awake and awake and alert  Airway patency: patent  Nausea & Vomiting: no nausea and no vomiting  Complications: no  Cardiovascular status: blood pressure returned to baseline and hemodynamically stable  Respiratory status: acceptable  Hydration status: euvolemic  Pain management: adequate

## 2023-08-23 VITALS
WEIGHT: 205 LBS | HEART RATE: 68 BPM | SYSTOLIC BLOOD PRESSURE: 130 MMHG | BODY MASS INDEX: 27.05 KG/M2 | RESPIRATION RATE: 20 BRPM | TEMPERATURE: 97.1 F | OXYGEN SATURATION: 98 % | DIASTOLIC BLOOD PRESSURE: 78 MMHG

## 2023-08-23 LAB
ANION GAP SERPL CALCULATED.3IONS-SCNC: 7 MEQ/L (ref 9–15)
BUN SERPL-MCNC: 12 MG/DL (ref 6–20)
CALCIUM SERPL-MCNC: 8.5 MG/DL (ref 8.5–9.9)
CHLORIDE SERPL-SCNC: 106 MEQ/L (ref 95–107)
CO2 SERPL-SCNC: 25 MEQ/L (ref 20–31)
CREAT SERPL-MCNC: 0.97 MG/DL (ref 0.7–1.2)
ERYTHROCYTE [DISTWIDTH] IN BLOOD BY AUTOMATED COUNT: 13.8 % (ref 11.5–14.5)
GLUCOSE SERPL-MCNC: 95 MG/DL (ref 70–99)
HCT VFR BLD AUTO: 38.4 % (ref 42–52)
HGB BLD-MCNC: 13.1 G/DL (ref 14–18)
MCH RBC QN AUTO: 30.7 PG (ref 27–31.3)
MCHC RBC AUTO-ENTMCNC: 34.2 % (ref 33–37)
MCV RBC AUTO: 89.7 FL (ref 79–92.2)
PLATELET # BLD AUTO: 201 K/UL (ref 130–400)
POTASSIUM SERPL-SCNC: 4.1 MEQ/L (ref 3.4–4.9)
RBC # BLD AUTO: 4.28 M/UL (ref 4.7–6.1)
SODIUM SERPL-SCNC: 138 MEQ/L (ref 135–144)
WBC # BLD AUTO: 8.9 K/UL (ref 4.8–10.8)

## 2023-08-23 PROCEDURE — G0378 HOSPITAL OBSERVATION PER HR: HCPCS

## 2023-08-23 PROCEDURE — 85027 COMPLETE CBC AUTOMATED: CPT

## 2023-08-23 PROCEDURE — 97161 PT EVAL LOW COMPLEX 20 MIN: CPT

## 2023-08-23 PROCEDURE — 6360000002 HC RX W HCPCS: Performed by: PHYSICIAN ASSISTANT

## 2023-08-23 PROCEDURE — 96374 THER/PROPH/DIAG INJ IV PUSH: CPT

## 2023-08-23 PROCEDURE — 6370000000 HC RX 637 (ALT 250 FOR IP): Performed by: PHYSICIAN ASSISTANT

## 2023-08-23 PROCEDURE — 2580000003 HC RX 258: Performed by: PHYSICIAN ASSISTANT

## 2023-08-23 PROCEDURE — 80048 BASIC METABOLIC PNL TOTAL CA: CPT

## 2023-08-23 PROCEDURE — 36415 COLL VENOUS BLD VENIPUNCTURE: CPT

## 2023-08-23 PROCEDURE — 97166 OT EVAL MOD COMPLEX 45 MIN: CPT

## 2023-08-23 PROCEDURE — 97165 OT EVAL LOW COMPLEX 30 MIN: CPT

## 2023-08-23 RX ORDER — ONDANSETRON 4 MG/1
4 TABLET, ORALLY DISINTEGRATING ORAL EVERY 12 HOURS PRN
Qty: 20 TABLET | Refills: 0 | Status: SHIPPED | OUTPATIENT
Start: 2023-08-23 | End: 2023-09-02

## 2023-08-23 RX ORDER — ASPIRIN 81 MG/1
81 TABLET ORAL 2 TIMES DAILY
Qty: 60 TABLET | Refills: 0 | Status: SHIPPED | OUTPATIENT
Start: 2023-08-23 | End: 2023-09-22

## 2023-08-23 RX ORDER — MELOXICAM 15 MG/1
15 TABLET ORAL DAILY
Qty: 45 TABLET | Refills: 0 | Status: SHIPPED | OUTPATIENT
Start: 2023-08-23 | End: 2023-10-07

## 2023-08-23 RX ORDER — OXYCODONE HYDROCHLORIDE 5 MG/1
5 TABLET ORAL EVERY 6 HOURS PRN
Qty: 24 TABLET | Refills: 0 | Status: SHIPPED | OUTPATIENT
Start: 2023-08-23 | End: 2023-08-28 | Stop reason: SDUPTHER

## 2023-08-23 RX ORDER — SENNA AND DOCUSATE SODIUM 50; 8.6 MG/1; MG/1
1 TABLET, FILM COATED ORAL 2 TIMES DAILY
Qty: 30 TABLET | Refills: 0 | Status: SHIPPED | OUTPATIENT
Start: 2023-08-23 | End: 2023-09-07

## 2023-08-23 RX ORDER — ACETAMINOPHEN 325 MG/1
650 TABLET ORAL EVERY 6 HOURS PRN
Qty: 60 TABLET | Refills: 0 | Status: SHIPPED | OUTPATIENT
Start: 2023-08-23 | End: 2023-09-07

## 2023-08-23 RX ADMIN — KETOROLAC TROMETHAMINE 30 MG: 30 INJECTION, SOLUTION INTRAMUSCULAR; INTRAVENOUS at 04:55

## 2023-08-23 RX ADMIN — Medication 10 ML: at 08:37

## 2023-08-23 RX ADMIN — SENNOSIDES AND DOCUSATE SODIUM 1 TABLET: 50; 8.6 TABLET ORAL at 08:35

## 2023-08-23 RX ADMIN — OXYCODONE HYDROCHLORIDE 10 MG: 5 TABLET ORAL at 13:23

## 2023-08-23 RX ADMIN — ASPIRIN 81 MG: 81 TABLET, COATED ORAL at 08:35

## 2023-08-23 RX ADMIN — OXYCODONE HYDROCHLORIDE 10 MG: 5 TABLET ORAL at 17:30

## 2023-08-23 RX ADMIN — OXYCODONE HYDROCHLORIDE 10 MG: 5 TABLET ORAL at 08:36

## 2023-08-23 RX ADMIN — ONDANSETRON 4 MG: 4 TABLET, ORALLY DISINTEGRATING ORAL at 10:51

## 2023-08-23 RX ADMIN — OXYCODONE HYDROCHLORIDE 10 MG: 5 TABLET ORAL at 01:55

## 2023-08-23 RX ADMIN — ACETAMINOPHEN 650 MG: 325 TABLET ORAL at 04:55

## 2023-08-23 RX ADMIN — CEFAZOLIN 2000 MG: 2 INJECTION, POWDER, FOR SOLUTION INTRAMUSCULAR; INTRAVENOUS at 05:19

## 2023-08-23 RX ADMIN — ACETAMINOPHEN 650 MG: 325 TABLET ORAL at 17:30

## 2023-08-23 RX ADMIN — ACETAMINOPHEN 650 MG: 325 TABLET ORAL at 12:35

## 2023-08-23 ASSESSMENT — PAIN DESCRIPTION - ORIENTATION
ORIENTATION: RIGHT

## 2023-08-23 ASSESSMENT — PAIN SCALES - GENERAL
PAINLEVEL_OUTOF10: 5
PAINLEVEL_OUTOF10: 7
PAINLEVEL_OUTOF10: 9
PAINLEVEL_OUTOF10: 3
PAINLEVEL_OUTOF10: 7
PAINLEVEL_OUTOF10: 7
PAINLEVEL_OUTOF10: 8
PAINLEVEL_OUTOF10: 5

## 2023-08-23 ASSESSMENT — PAIN DESCRIPTION - DESCRIPTORS
DESCRIPTORS: THROBBING
DESCRIPTORS: ACHING
DESCRIPTORS: ACHING
DESCRIPTORS: THROBBING
DESCRIPTORS: OTHER (COMMENT)
DESCRIPTORS: BURNING

## 2023-08-23 ASSESSMENT — PAIN DESCRIPTION - LOCATION
LOCATION: KNEE

## 2023-08-23 NOTE — PLAN OF CARE
Discharge instructions reviewed with patient. Verbalized understanding. No further questions or concerns. Medications discussed and reviewed including side effects. Follow-up appointments discussed. Post-op instructions for total knee revision discussed and reviewed as well. Waiting wheelchair transport.   Electronically signed by Yesi Mckeon RN on 8/23/2023 at 5:36 PM        Problem: Discharge Planning  Goal: Discharge to home or other facility with appropriate resources  Outcome: Completed     Problem: Pain  Goal: Verbalizes/displays adequate comfort level or baseline comfort level  Outcome: Completed     Problem: ABCDS Injury Assessment  Goal: Absence of physical injury  Outcome: Completed     Problem: Safety - Adult  Goal: Free from fall injury  Outcome: Completed

## 2023-08-23 NOTE — CARE COORDINATION
Met with pt at bedside. Pt from home with family and plans to return on discharge. Pt owns walker, no O2, no [facility], no HD, no services. Freedom of choice offered and pt would like [facility]. Referral called to [facility] with [facility] who can accept the pt.

## 2023-08-23 NOTE — DISCHARGE SUMMARY
[facility]  Discharge Summary    Orthopedic care:  Dr. Phuong Jaffe PA-C    Admission Date: 8/22/2023   Discharge Date:  8/23    Reason for admission / final diagnosis: Right knee revision - polyliner [facility] Course (with pertinent laboratory and radiographic results): The patient was admitted under the indication of a right knee revision. The surgery was performed by Dr. Morgan Ghosh on 8/22 , there was no intraoperative or postoperative complications. The patient's stability and motion was much improved when comparing preoperative and postoperative examination. There is no significant pertinent abnormalities on any of the blood work following the surgery. X-ray of the patient's right knee did not demonstrate a lucency or fracture, foreign body, is well-placed prosthesis. The patient's pain is well controlled with Roxicodone. We discussed adverse reaction to this medication which includes addiction, constipation, nausea and vomiting. To best ensure that they will not experience any significant constipation they were prescribed a bowel regimen to be be taken whenever she is using the narcotics. I also sent some Zofran to help nausea that is recommending of since surgery, we will use aspirin 81 mg twice daily for DVT prophylaxis. The patient was evaluated and treated by physical and Occupational Therapy during her stay and discharged on 8/23 in satisfactory condition on postoperative day one.

## 2023-08-23 NOTE — FLOWSHEET NOTE
Zofran administered effective per patient. cleared by physical and occupational therapy. Waiting discharge order to be placed by ortho.   Electronically signed by Derrek Sexton RN on 8/23/2023 at 2:36 PM

## 2023-08-23 NOTE — PROGRESS NOTES
Physical Therapy Med Surg Initial Assessment  Facility/Department: [facility]  [facility]       NAME: Guera Maguire  : 1965 (63 y.o.)  MRN: [id number]  CODE STATUS: Full Code    Date of Service: 2023    Patient Diagnosis(es): Pain due to total right knee replacement, subsequent encounter [T84.84XD, Z96.651]  S/P revision of total knee, right [Z96.651]  Pain due to total right knee replacement, initial encounter ([address]) [N90.48PN, Z96.651]   No chief complaint on file. Patient Active Problem List    Diagnosis Date Noted    S/P revision of total knee, right 2023    Pain due to total right knee replacement, initial encounter ([address]) 2023    Pain due to total right knee replacement ([address]) 2023        Past Medical History:   Diagnosis Date    Arthritis      History reviewed. No pertinent surgical history.     Patient assessed for rehabilitation services?: Yes  Family / Caregiver Present: No    Restrictions:  Restrictions/Precautions: Fall Risk, Weight Bearing  Lower Extremity Weight Bearing Restrictions  Right Lower Extremity Weight Bearing: Weight Bearing As Tolerated     SUBJECTIVE:   Pain   R knee 7/10 pre and post session- pt states he was medicated prior to PT arrival    Prior Level of Function:  Social/Functional History  Lives With: Significant other  Type of Home: House  Home Layout: One level  Home Access: Level entry  Bathroom Shower/Tub: Tub/Shower unit  Bathroom Equipment: Shower chair, Toilet raiser, Grab bars in [address] [place] [address]: [facility]  Has the patient had two or more falls in the past year or any fall with injury in the past year?: No  ADL Assistance: [address] Independent  Homemaking Responsibilities: Yes  Ambulation Assistance: Independent (no AD)  Transfer Assistance: Independent  Active : Yes  Occupation: Unemployed    OBJECTIVE:   Vision  Vision: Impaired  Vision Exceptions: Wears glasses for reading  Hearing: Within functional limits    Cognition:  Overall Orientation Status: Within Functional Limits  Orientation Level: Oriented X4  Follows Commands: Within Functional Limits  Overall Cognitive Status: WFL    Observation/Palpation  Posture: Good  Observation: right knee incision with bandage in place    ROM:  RLE AROM: WFL  RLE General AROM: R knee appox -10 to 60 deg  LLE AROM : WFL    Strength:  Strength RLE  Comment: grossly 4/5  Strength LLE  Strength LLE: WFL    Neuro:  Balance  Sitting - Static: Good  Sitting - Dynamic: Good  Standing - Static: Fair;+  Standing - Dynamic: Fair;-    Sensation: Impaired (anterior knee- pt reports this is a long standing issue)    Bed mobility  Supine to Sit: Modified independent  Sit to Supine:  (NT due to pt left up in bedside chair to encourage OOB activity)  Bed Mobility Comments: HOB elevated    Transfers  Sit to Stand: Modified independent  Stand to Sit: Modified independent  Comment: [employer] used; no dizziness; maintains decreased WBing R LE during standing due to increased R knee pain    Ambulation  Surface: Level tile  Device: Rolling Walker  Assistance: Supervision  Quality of Gait: standing rest breaks due to increased R knee pain; maintains decreased weight to R LE in SLS  R due to increased R LE pain  Gait Deviations: Slow Jess;Decreased step length;Decreased step height  Distance: 75ft    Stairs/Curb  Stairs?: No (pt does not have steps at home)    Activity Tolerance  Activity Tolerance: Patient limited by pain    Patient Education  Education Given To: Patient  Education Provided: Role of Therapy;Plan of Care;Precautions  Education Method: Verbal  Education Outcome: Verbalized understanding     ASSESSMENT:   Body Structures, Functions, Activity Limitations Requiring Skilled Therapeutic Intervention: Decreased functional mobility ; Decreased ROM; Decreased strength; Increased pain  Decision Making: Low Complexity  History: med  Exam: low  Clinical 3 = A little assistance

## 2023-08-24 NOTE — PROGRESS NOTES
Physical Therapy  Facility/Department: [facility] MED SURG S382/G359-70  Physical Therapy Discharge      NAME: Elisa Estrada    : 1965 (63 y.o.)  MRN: [id number]    Account: [de-identified]  Gender: male      Patient has been discharged from acute [facility] hospital. DC patient from current PT program.      Electronically signed by Reji Bauman PT on 23 at 4:21 PM EDT

## 2023-08-28 DIAGNOSIS — Z96.651 S/P REVISION OF TOTAL KNEE, RIGHT: ICD-10-CM

## 2023-08-28 RX ORDER — OXYCODONE HYDROCHLORIDE 5 MG/1
5 TABLET ORAL EVERY 6 HOURS PRN
Qty: 24 TABLET | Refills: 0 | Status: SHIPPED | OUTPATIENT
Start: 2023-08-28 | End: 2023-09-03

## 2023-08-28 NOTE — TELEPHONE ENCOUNTER
Pt paperwork says bandage should be removed 7 days Post op which would be 8/29 but his appointment is on 9/5 should he take it off or wait ?

## 2023-08-28 NOTE — TELEPHONE ENCOUNTER
I called the patient. He may remove Aquacel dressing tomorrow. Monty has already refilled his pain medication.

## 2023-09-05 ENCOUNTER — OFFICE VISIT (OUTPATIENT)
Dept: ORTHOPEDIC SURGERY | Age: 58
End: 2023-09-05

## 2023-09-05 ENCOUNTER — HOSPITAL ENCOUNTER (OUTPATIENT)
Dept: ORTHOPEDIC SURGERY | Age: 58
Discharge: HOME | End: 2023-09-07
Payer: COMMERCIAL

## 2023-09-05 VITALS — WEIGHT: 205 LBS | TEMPERATURE: 97.3 F | BODY MASS INDEX: 27.17 KG/M2 | HEART RATE: 72 BPM | HEIGHT: 73 IN

## 2023-09-05 DIAGNOSIS — Z96.651 S/P REVISION OF TOTAL KNEE, RIGHT: ICD-10-CM

## 2023-09-05 DIAGNOSIS — M25.561 RIGHT KNEE PAIN, UNSPECIFIED CHRONICITY: Primary | ICD-10-CM

## 2023-09-05 DIAGNOSIS — M25.561 RIGHT KNEE PAIN, UNSPECIFIED CHRONICITY: ICD-10-CM

## 2023-09-05 PROCEDURE — 99024 POSTOP FOLLOW-UP VISIT: CPT | Performed by: PHYSICIAN ASSISTANT

## 2023-09-05 PROCEDURE — 73562 X-RAY EXAM OF KNEE 3: CPT

## 2023-09-05 RX ORDER — CYCLOBENZAPRINE HCL 5 MG
5 TABLET ORAL 2 TIMES DAILY PRN
Qty: 10 TABLET | Refills: 0 | Status: SHIPPED | OUTPATIENT
Start: 2023-09-05 | End: 2023-09-15

## 2023-09-05 RX ORDER — OXYCODONE HYDROCHLORIDE AND ACETAMINOPHEN 5; 325 MG/1; MG/1
1 TABLET ORAL EVERY 8 HOURS PRN
Qty: 18 TABLET | Refills: 0 | Status: SHIPPED | OUTPATIENT
Start: 2023-09-05 | End: 2023-09-11

## 2023-09-05 NOTE — PROGRESS NOTES
[facility]      Subjective:      Chief Complaint   Patient presents with    Post-Op Check     Pt present here today for post-op exam he states that the swelling is going down and he is in little to no pain. HPI: Vianney Musa is a 63 y.o. male who is here 2 weeks after poly exchange of a right total knee replacement was done years back by Dr. Bacilio Degroot. Incision is healing well. Is working with therapy. He has already been in the 90 degrees. He has no significant laxity or instability. He has no significant swelling. The pain is well managed except for after therapy     Past Medical History:   Diagnosis Date    Arthritis       Past Surgical History:   Procedure Laterality Date    REVISION TOTAL KNEE ARTHROPLASTY Right 2023    Right total knee revision,Carina polyethylene; Carina persona revision knee system. ,Spinal. EDGAR / Kasandra Kulkarni performed by Nona Easton MD at [address] [place] [address] History     Socioeconomic History    Marital status: Single     Spouse name: Not on file    Number of children: Not on file    Years of education: Not on file    Highest education level: Not on file   Occupational History    Not on file   Tobacco Use    Smoking status: Former     Packs/day: 0.50     Years: 30.00     Pack years: 15.00     Types: Cigarettes     Quit date: 2023     Years since quittin.0    Smokeless tobacco: Never   Substance and Sexual Activity    Alcohol use:  Yes     Alcohol/week: 2.0 standard drinks     Types: 2 Cans of beer per week    Drug use: Not Currently     Types: Marijuana Arthur Harm)     Comment: hs not don x 1 week    Sexual activity: Not on file   Other Topics Concern    Not on file   Social History Narrative    Not on file     Social Determinants of Health     Financial Resource Strain: Not on file   Food Insecurity: Not on file   Transportation Needs: Not on file   Physical Activity: Not on file   Stress: Not on file   Social Connections: Not on file

## 2023-09-12 ENCOUNTER — HOSPITAL ENCOUNTER (OUTPATIENT)
Dept: PHYSICAL THERAPY | Age: 58
Setting detail: THERAPIES SERIES
Discharge: HOME | End: 2023-09-12
Payer: COMMERCIAL

## 2023-09-12 PROCEDURE — 97161 PT EVAL LOW COMPLEX 20 MIN: CPT

## 2023-09-12 ASSESSMENT — PAIN DESCRIPTION - LOCATION: LOCATION: KNEE

## 2023-09-12 ASSESSMENT — PAIN DESCRIPTION - DESCRIPTORS: DESCRIPTORS: ACHING

## 2023-09-12 ASSESSMENT — PAIN SCALES - GENERAL: PAINLEVEL_OUTOF10: 2

## 2023-09-12 ASSESSMENT — PAIN DESCRIPTION - ORIENTATION: ORIENTATION: RIGHT

## 2023-09-12 NOTE — PROGRESS NOTES
[facility]  PHYSICAL THERAPY PLAN OF CARE   [address] [facility] [place], [address] [facility]         Phone: [phone]  Fax: [phone]    [] Certification  [] Recertification [x]  Plan of Care  [] Progress Note [] Discharge      Referring Provider: Millie Morataya PA-C     From:  Veto Jones, PT, DPT  Patient: Magui Oropeza (63 y.o. male) : 1965 Date: 2023  Medical Diagnosis: Right knee pain, unspecified chronicity [M25.561]       Treatment Diagnosis: R knee pain, R knee swelling, R knee weakness, decreased R knee ROM    Progress Report Period from:  2023  to 2023    Visits to Date: 1 No Show: 0 Cancelled Appts: 0    OBJECTIVE:   Long Term Goals - Time Frame for Long Term Goals : 6 weeks  Goals Current/ Discharge status Status   Long Term Goal 1: R knee extension to lacking no more than 5 deg extension to improve gait mechanics  Lacking 13 deg ext   New   Long Term Goal 2: R knee flexion ROM to > 105 deg to improve ability to squat, kneel, and climb ladders for work  92 deg flexion   New   Long Term Goal 3: 5/5 RLE strength to allow unrestricted stair and ladder climbing for work duties  Strength LLE  Comment: 5/5 throughout LLE    Strength RLE  Strength RLE: Exception  Comment: R knee pain concentric and eccentric loading  R Hip Flexion: 5/5  R Hip Extension: 5/5  R Hip ABduction: 5/5  R Hip ADduction: 5/5  R Knee Flexion: 4+/5  R Knee Extension: 4+/5  R Ankle Dorsiflexion: 5/5  R Ankle Plantar flexion: 5/5               New   Long Term Goal 4: 90% daily function reported subjectively or via outcome score (LEFS). 50% reported on intake  Exam: LEFS: 37/80     New   Long Term Goal 5: Independent with HEP to self manage exercises upon discharge.   HEP ongoing; progressions anticipated   New     Body Structures, Functions, Activity Limitations Requiring Skilled Therapeutic Intervention: Increased pain, Decreased ROM, Decreased strength, Decreased body mechanics, Decreased

## 2023-09-12 NOTE — PROGRESS NOTES
[facility]  PHYSICAL THERAPY EVALUATION      Physical Therapy: Initial Evaluation    Patient: Domenic Osorio (50 y.o.     male)   Examination Date: 2023   :  1965 ;    Confirmed: Yes MRN: [id number]  CSN: [id number]   Insurance: Payor: Curly Westfall / Plan: DailyStrength / Product Type: *No Product type* /   Insurance ID: [id number] - (Medicaid Managed)  PT Insurance Information: [facility] Secondary Insurance (if applicable):     Referring Physician: Chaitanya Singh PA-C       Visits to Date/Visits Approved:     No Show/Cancelled Appts: 0      Medical Diagnosis: Right knee pain, unspecified chronicity [M25.561]        Treatment Diagnosis: R knee pain, R knee swelling, R knee weakness, decreased R knee ROM     PERTINENT MEDICAL HISTORY   Patient Assessed for Rehabilitation Services: Yes       Medical History: Chart Reviewed: Yes   Past Medical History:   Diagnosis Date    Arthritis      Surgical History:   Past Surgical History:   Procedure Laterality Date    REVISION TOTAL KNEE ARTHROPLASTY Right 2023    Right total knee revision,Carina polyethylene; Carina persona revision knee system. ,Spinal. Lon Bertrand / Martínez Aguayo performed by Clarke Pena MD at [address] [place] [address] Drive       Medications:   Current Outpatient Medications:     cyclobenzaprine (FLEXERIL) 5 MG tablet, Take 1 tablet by mouth 2 times daily as needed for Muscle spasms, Disp: 10 tablet, Rfl: 0    acetaminophen (TYLENOL) 325 MG tablet, Take 2 tablets by mouth every 6 hours as needed for Pain, Disp: 60 tablet, Rfl: 0    aspirin 81 MG EC tablet, Take 1 tablet by mouth 2 times daily, Disp: 60 tablet, Rfl: 0    meloxicam (MOBIC) 15 MG tablet, Take 1 tablet by mouth daily, Disp: 45 tablet, Rfl: 0    cloNIDine (CATAPRES) 0.1 MG tablet, Take 1 tablet by mouth nightly, Disp: , Rfl:     metaxalone (SKELAXIN) 800 MG tablet, Take by mouth, Disp: , Rfl:     Misc.  Devices (BATH/SHOWER SEAT) MISC, Dispense 1

## 2023-09-15 ENCOUNTER — HOSPITAL ENCOUNTER (OUTPATIENT)
Dept: PHYSICAL THERAPY | Age: 58
Setting detail: THERAPIES SERIES
Discharge: HOME | End: 2023-09-15
Payer: COMMERCIAL

## 2023-09-15 PROCEDURE — 97110 THERAPEUTIC EXERCISES: CPT

## 2023-09-15 ASSESSMENT — PAIN DESCRIPTION - LOCATION: LOCATION: KNEE

## 2023-09-15 ASSESSMENT — PAIN DESCRIPTION - DESCRIPTORS: DESCRIPTORS: ACHING

## 2023-09-15 ASSESSMENT — PAIN DESCRIPTION - PAIN TYPE: TYPE: ACUTE PAIN

## 2023-09-15 ASSESSMENT — PAIN SCALES - GENERAL: PAINLEVEL_OUTOF10: 2

## 2023-09-15 ASSESSMENT — PAIN DESCRIPTION - ORIENTATION: ORIENTATION: RIGHT

## 2023-09-15 NOTE — PROGRESS NOTES
current HEP. See objective section for any therapeutic exercise changes, additions or modifications this date.     Therapy Time:      PT Individual Minutes  Time In: 2478  Time Out: 1410  Minutes: 35  Timed Code Treatment Minutes: 35 Minutes  Procedure Minutes:0  Timed Activity Minutes Units   Ther Ex 35 2     Electronically signed by Toro Soto PTA on 9/15/23 at 2:20 PM EDT

## 2023-09-18 ENCOUNTER — HOSPITAL ENCOUNTER (OUTPATIENT)
Dept: PHYSICAL THERAPY | Age: 58
Setting detail: THERAPIES SERIES
Discharge: HOME | End: 2023-09-18
Payer: COMMERCIAL

## 2023-09-18 DIAGNOSIS — Z96.651 S/P REVISION OF TOTAL KNEE, RIGHT: ICD-10-CM

## 2023-09-18 PROCEDURE — 97110 THERAPEUTIC EXERCISES: CPT

## 2023-09-18 RX ORDER — OXYCODONE HYDROCHLORIDE AND ACETAMINOPHEN 5; 325 MG/1; MG/1
1 TABLET ORAL 2 TIMES DAILY PRN
Qty: 14 TABLET | Refills: 0 | Status: SHIPPED | OUTPATIENT
Start: 2023-09-18 | End: 2023-09-25

## 2023-09-18 RX ORDER — CYCLOBENZAPRINE HCL 5 MG
5 TABLET ORAL 2 TIMES DAILY PRN
Qty: 10 TABLET | Refills: 0 | Status: SHIPPED | OUTPATIENT
Start: 2023-09-18 | End: 2023-09-28

## 2023-09-18 ASSESSMENT — PAIN DESCRIPTION - PAIN TYPE: TYPE: ACUTE PAIN

## 2023-09-18 ASSESSMENT — PAIN DESCRIPTION - ORIENTATION: ORIENTATION: RIGHT

## 2023-09-18 ASSESSMENT — PAIN SCALES - GENERAL: PAINLEVEL_OUTOF10: 6

## 2023-09-18 ASSESSMENT — PAIN DESCRIPTION - LOCATION: LOCATION: KNEE

## 2023-09-18 NOTE — PROGRESS NOTES
[address] [facility]  Outpatient Physical Therapy    Treatment Note        Date: 2023  Patient: Michael Arita  : 1965   Confirmed: Yes  MRN: [id number]  Referring Provider: Jluis Talley PA-C    Medical Diagnosis: Right knee pain, unspecified chronicity [M25.561]       Treatment Diagnosis: R knee pain, R knee swelling, R knee weakness, decreased R knee ROM    Visit Information:  Insurance: Payor: Anaya Branham / Plan: Vernadine Pares / Product Type: *No Product type* /   PT Visit Information  Onset Date: 23  PT Insurance Information: caresource  Total # of Visits Approved: 1  Total # of Visits to Date: 3  Plan of Care/Certification Expiration Date: 23  No Show: 0  Canceled Appointment: 0  Progress Note Counter:  ( 5/72 units by )    Subjective Information:  Subjective: it's been screaming for the past few days since saturday. I think I might have over done it with my stretching and the TKE my pain isn't throughout the quad now its at the proximal tibia  HEP Compliance:  [x] Good [] Fair [] Poor [] Reports not doing due to:    Pain Screening  Patient Currently in Pain: Yes  Pain Level: 6  Pain Type: Acute pain  Pain Location: Knee  Pain Orientation: Right    Treatment:  Exercises:  Exercises  Exercise 1: LS heel slides with overpressure 5\"x10  Exercise 2: stationary Bike seat 8 5 minutes  Exercise 4: step ups 6\" forward/lateral x10 ea ( stairs x 3 0-2Ue support)  Exercise 5: step downs 2\" x 4 DC due to increased difficulty.  slight pain increase noted throughout the quad  Exercise 6: *apollo leg press  Exercise 7: **focus on regain R knee extension and maximizing flexion ROM  Exercise 9: supine gravity assisted extension ankle propped on rolled towel x 3 minutes  minor rest breaks throughout  Exercise 10: LS QS into towel 10 x 5\"  Exercise 20: HEP: QS into towel, passive knee extension HS with strap        *Indicates exercise, modality, or manual techniques

## 2023-09-18 NOTE — TELEPHONE ENCOUNTER
PATIENT STATES HE WILL NOT BE ABLE TO GO AND DO PT WITHOUT PAIN MEDICATION. LAST TOOK PAIN MEDICATION ON FRIDAY. Rx requested:  Requested Prescriptions     Pending Prescriptions Disp Refills    oxyCODONE-acetaminophen (PERCOCET) 5-325 MG per tablet 18 tablet 0     Sig: Take 1 tablet by mouth every 8 hours as needed for Pain for up to 6 days. Intended supply: 5 days.  Take lowest dose possible to manage pain Max Daily Amount: 3 tablets    cyclobenzaprine (FLEXERIL) 5 MG tablet 10 tablet 0     Sig: Take 1 tablet by mouth 2 times daily as needed for Muscle spasms       Last Office Visit:   8/4/2023      Next Visit Date:  Future Appointments   Date Time Provider [address]   9/18/2023 12:45 PM Abiola Abdias,  [place]   9/20/2023 10:15 AM [place] Spotted, PTA [facility] [address]   9/25/2023 10:15 AM Abiola Abdias,  [place]   9/27/2023 10:15 AM Elio Spotted, PTA [facility] [address]   10/2/2023 10:15 AM Abiola Abdias, PTA [facility] [address]   10/3/2023  9:15 AM ERIK Laws [facility]   10/4/2023 10:15 AM [place] Spotted, PTA [facility] [address]   10/9/2023 10:15 AM [facility] [address] [place] [place]   10/11/2023 10:15 AM Elio Spotted,  [place]

## 2023-09-20 ENCOUNTER — HOSPITAL ENCOUNTER (OUTPATIENT)
Dept: PHYSICAL THERAPY | Age: 58
Setting detail: THERAPIES SERIES
Discharge: HOME | End: 2023-09-20
Payer: COMMERCIAL

## 2023-09-20 NOTE — PROGRESS NOTES
Therapy                            Cancellation/No-show Note      Date: 2023  Patient: Bibi Chinchilla (89 y.o. male)  : 1965  MRN:  [id number]  Referring Physician: Morgan Pinedo PA-C    Medical Diagnosis: Right knee pain, unspecified chronicity [M25.561]      Visit Information:  Visits to Date 3   No Show/Cancelled Appts: 0       For today's appointment patient:  [x]  Cancelled  []  Rescheduled appointment  []  No-show   []  Called pt to remind of next appointment     Reason given by patient:  []  Patient ill  []  Conflicting appointment  [x]  No transportation    []  Conflict with work  []  No reason given  []  Other:      [x] Pt has future appointments scheduled, no follow up needed  [] Pt requests to be on hold.     Reason:   If > 2 weeks please discuss with therapist.  [] Therapist to call pt for follow up     Comments:       Signature: Electronically signed by Lucas Motta PTA on 23 at 9:45 AM EDT

## 2023-09-22 ENCOUNTER — HOSPITAL ENCOUNTER (OUTPATIENT)
Dept: PHYSICAL THERAPY | Age: 58
Setting detail: THERAPIES SERIES
Discharge: HOME | End: 2023-09-22
Payer: COMMERCIAL

## 2023-09-22 PROCEDURE — 97110 THERAPEUTIC EXERCISES: CPT

## 2023-09-22 ASSESSMENT — PAIN SCALES - GENERAL: PAINLEVEL_OUTOF10: 2

## 2023-09-22 ASSESSMENT — PAIN DESCRIPTION - LOCATION: LOCATION: KNEE

## 2023-09-22 ASSESSMENT — PAIN DESCRIPTION - ORIENTATION: ORIENTATION: RIGHT

## 2023-09-22 ASSESSMENT — PAIN DESCRIPTION - DESCRIPTORS: DESCRIPTORS: ACHING;SORE

## 2023-09-22 ASSESSMENT — PAIN DESCRIPTION - PAIN TYPE: TYPE: SURGICAL PAIN

## 2023-09-22 NOTE — PROGRESS NOTES
manual techniques to be initiated when appropriate    Objective Measures:      LTG 1 Current Status[de-identified] 9/22/23:Right knee extension lacking 5 degrees  LTG 2 Current Status[de-identified] 9/22/23: Right knee flexion 101 degrees in seated/supine        Assessment: Body Structures, Functions, Activity Limitations Requiring Skilled Therapeutic Intervention: Increased pain, Decreased ROM, Decreased strength, Decreased body mechanics, Decreased high-level IADLs  Assessment: Progressed exercises this date  to improve mobility and strength with good results. Pt reported fatigue throughout but demonstrates good functionality throughout tx Pt ROM has improved partially meeting goal for extension this date and making subtle improvements towards flexion goals this date. reviewed cross friction massage which will be introduced when incision is fully healed with verbalized understanding. Progress as able. Treatment Diagnosis: R knee pain, R knee swelling, R knee weakness, decreased R knee ROM  Therapy Prognosis: Good       Patient Education: [x] NA       Post-Pain Assessment:       Pain Rating (0-10 pain scale):  2 /10   Location and pain description same as pre-treatment unless indicated. Action: [] NA   [x] Perform HEP  [] Meds as prescribed  [] Modalities as prescribed   [] Call Physician     GOALS   Patient Goal(s):         Long Term Goals Completed by 6 weeks Goal Status   LTG 1 R knee extension to lacking no more than 5 deg extension to improve gait mechanics In progress, Partially met   LTG 2 R knee flexion ROM to > 105 deg to improve ability to squat, kneel, and climb ladders for work In progress   LTG 3 5/5 RLE strength to allow unrestricted stair and ladder climbing for work duties In progress   LTG 4 90% daily function reported subjectively or via outcome score (LEFS). In progress   LTG 5 Independent with HEP to self manage exercises upon discharge.  In progress               Plan:  Frequency/Duration:  Plan  Plan Frequency:

## 2023-09-25 ENCOUNTER — HOSPITAL ENCOUNTER (OUTPATIENT)
Dept: PHYSICAL THERAPY | Age: 58
Setting detail: THERAPIES SERIES
Discharge: HOME | End: 2023-09-25
Payer: COMMERCIAL

## 2023-09-25 NOTE — PROGRESS NOTES
Therapy                            Cancellation/No-show Note      Date: 2023  Patient: Kareen Owens (42 y.o. male)  : 1965  MRN:  [id number]  Referring Physician: Tiesha Solano PA-C    Medical Diagnosis: Right knee pain, unspecified chronicity [M25.561]      Visit Information:  Visits to Date 4   No Show/Cancelled Appts: 0 / 2      For today's appointment patient:  [x]  Cancelled  []  Rescheduled appointment  []  No-show   []  Called pt to remind of next appointment     Reason given by patient:  []  Patient ill  []  Conflicting appointment  []  No transportation    []  Conflict with work  []  No reason given  [x]  Other:  Pt reports new bulge just above the groin with increased pain wishes to see physician. [x] Pt has future appointments scheduled, no follow up needed  [] Pt requests to be on hold. Reason:   If > 2 weeks please discuss with therapist.  [] Therapist to call pt for follow up     Comments:   may miss Wednesday dependent on pain and MD instruction.     Signature: Electronically signed by Dayana Morales PTA on 23 at 10:33 AM EDT

## 2023-09-26 ENCOUNTER — TELEPHONE (OUTPATIENT)
Dept: ORTHOPEDIC SURGERY | Age: 58
End: 2023-09-26

## 2023-09-26 ENCOUNTER — OFFICE VISIT (OUTPATIENT)
Dept: ORTHOPEDIC SURGERY | Age: 58
End: 2023-09-26

## 2023-09-26 VITALS — HEART RATE: 91 BPM | WEIGHT: 205 LBS | BODY MASS INDEX: 27.05 KG/M2 | TEMPERATURE: 97.4 F | OXYGEN SATURATION: 99 %

## 2023-09-26 DIAGNOSIS — Z96.651 S/P REVISION OF TOTAL KNEE, RIGHT: Primary | ICD-10-CM

## 2023-09-26 DIAGNOSIS — K40.90 NON-RECURRENT UNILATERAL INGUINAL HERNIA WITHOUT OBSTRUCTION OR GANGRENE: ICD-10-CM

## 2023-09-26 PROCEDURE — PREOPEXAM PRE-OP EXAM: Performed by: PHYSICIAN ASSISTANT

## 2023-09-26 RX ORDER — OXYCODONE HYDROCHLORIDE AND ACETAMINOPHEN 5; 325 MG/1; MG/1
1 TABLET ORAL 2 TIMES DAILY PRN
Qty: 14 TABLET | Refills: 0 | Status: SHIPPED | OUTPATIENT
Start: 2023-09-26 | End: 2023-10-03

## 2023-09-26 NOTE — PROGRESS NOTES
Narrative Referring Provider:   Nicolasa Sahni      PCP:   No primary care provider on file.    ============================  IMPRESSION/PLAN:  ============================  Radha Winslow is s/p right  poly exchange  completed on 2023. Patient states he was doing well in physical therapy and then at his last physical therapy visit he was pushing on the leg press machine when he developed right knee pain and left-sided groin pain. He was seen by [facility] and dentistry last night and they recommend surgical consultation for inguinal hernia. IMPRESSION: Slow post-operative recovery    PLAN:  Rest, Ice, Compression, Elevation PRN. , Continue physical therapy. , and consult to general surgeon as patient is now having groin pain  Routine follow-up. Ice compression and elevation  Patient Reassurance: Normal post-operative course discussed with patient. Patient reassured and supported. All questions answered. Follow up 2 weeks No X-Rays Needed    Patient presents today for early  post-op visit    Status post op:  right polyethylene exchange     BMI: There is no height or weight on file to calculate BMI. Post-operative recovery was complicated by uneventful/none. Patient rates their condition as improving. Does the patient still experience pain? 6/10 pain, aching    Post Op discharge patient location: in home. Functional Assessment is as follows: is ready to begin outpatient PT. Functional difficulties: None. Pain Medication: Tylenol, rare oxycodone  Currently Ambulating with: No ambulatory aid  =================================  EXAM: POST OP KNEE  =================================  Right Post-Operative Knee    Ambulates with a limp favoring the right. SKIN: Appropriate postop appearance, No evidence of erythema, warmth, discharge or drainage and Incision clean/dry/intact. Range of motion is 5 degrees in extension and   80 degrees of flexion.     Extension La

## 2023-09-26 NOTE — TELEPHONE ENCOUNTER
Patient is coming in now for appointment to take a look at hernia after physical therapy for post surgery.

## 2023-09-27 ENCOUNTER — HOSPITAL ENCOUNTER (OUTPATIENT)
Dept: PHYSICAL THERAPY | Age: 58
Setting detail: THERAPIES SERIES
Discharge: HOME | End: 2023-09-27
Payer: COMMERCIAL

## 2023-09-27 NOTE — PROGRESS NOTES
Therapy                            Cancellation/No-show Note      Date: 2023  Patient: Michael Arita (76 y.o. male)  : 1965  MRN:  [id number]  Referring Physician: Jluis Talley PA-C    Medical Diagnosis: Right knee pain, unspecified chronicity [M25.561]      Visit Information:  Visits to Date 4   No Show/Cancelled Appts: 0 / 2      For today's appointment patient:  [x]  Cancelled  []  Rescheduled appointment  []  No-show   []  Called pt to remind of next appointment     Reason given by patient:  []  Patient ill  [x]  Conflicting appointment  []  No transportation    []  Conflict with work  []  No reason given  []  Other:      [x] Pt has future appointments scheduled, no follow up needed  [] Pt requests to be on hold.     Reason:   If > 2 weeks please discuss with therapist.  [] Therapist to call pt for follow up     Comments:       Signature: Electronically signed by Gera Dennis PTA on 23 at 10:27 AM EDT

## 2023-09-28 ENCOUNTER — HOSPITAL ENCOUNTER (OUTPATIENT)
Dept: PHYSICAL THERAPY | Age: 58
Setting detail: THERAPIES SERIES
Discharge: HOME | End: 2023-09-28
Payer: COMMERCIAL

## 2023-09-28 NOTE — PROGRESS NOTES
Therapy                            Cancellation/No-show Note      Date: 2023  Patient: Lalit Cain (28 y.o. male)  : 1965  MRN:  [id number]  Referring Physician: Angela Woodard PA-C    Medical Diagnosis: Right knee pain, unspecified chronicity [M25.561]      Visit Information:  Visits to Date 4   No Show/Cancelled Appts: 0 / 2      For today's appointment patient:  [x]  Cancelled  []  Rescheduled appointment  []  No-show   []  Called pt to remind of next appointment     Reason given by patient:  [x]  Patient ill  []  Conflicting appointment  []  No transportation    []  Conflict with work  []  No reason given  []  Other:      [x] Pt has future appointments scheduled, no follow up needed  [] Pt requests to be on hold.     Reason:   If > 2 weeks please discuss with therapist.  [] Therapist to call pt for follow up     Comments:       Signature: Electronically signed by Maximiliano Garcia PTA on 23 at 1:46 PM EDT

## 2023-10-02 ENCOUNTER — HOSPITAL ENCOUNTER (OUTPATIENT)
Dept: PHYSICAL THERAPY | Age: 58
Setting detail: THERAPIES SERIES
Discharge: HOME | End: 2023-10-02
Payer: COMMERCIAL

## 2023-10-02 PROCEDURE — 97110 THERAPEUTIC EXERCISES: CPT

## 2023-10-02 ASSESSMENT — PAIN SCALES - GENERAL: PAINLEVEL_OUTOF10: 2

## 2023-10-02 ASSESSMENT — PAIN DESCRIPTION - LOCATION: LOCATION: KNEE

## 2023-10-02 ASSESSMENT — PAIN DESCRIPTION - DESCRIPTORS: DESCRIPTORS: ACHING;SORE;TIGHTNESS

## 2023-10-02 ASSESSMENT — PAIN DESCRIPTION - ORIENTATION: ORIENTATION: RIGHT

## 2023-10-02 ASSESSMENT — PAIN DESCRIPTION - PAIN TYPE: TYPE: SURGICAL PAIN

## 2023-10-02 NOTE — PROGRESS NOTES
[facility]  Outpatient Physical Therapy    Treatment Note        Date: 10/2/2023  Patient: Josee Portillo  : 1965   Confirmed: Yes  MRN: [id number]  Referring Provider: Noah Amador PA-C    Medical Diagnosis: Right knee pain, unspecified chronicity [M25.561]       Treatment Diagnosis: R knee pain, R knee swelling, R knee weakness, decreased R knee ROM    Visit Information:  Insurance: Payor: Kiley Hicks / Plan: [employer] Johnny / Product Type: *No Product type* /   PT Visit Information  Onset Date: 23  PT Insurance Information: caresource  Total # of Visits Approved: 1  Total # of Visits to Date: 5  Plan of Care/Certification Expiration Date: 23  No Show: 0  Canceled Appointment: 2  Progress Note Counter:  ( 10/72 units by )    Subjective Information:  Subjective: \"I go on the  to get the hernia worked on. \"  HEP Compliance:  [x] Good [] Fair [] Poor [] Reports not doing due to:    Pain Screening  Patient Currently in Pain: Yes  Pain Level: 2  Pain Type: Surgical pain  Pain Location: Knee  Pain Orientation: Right  Pain Descriptors: Aching, Sore, Tightness    Treatment:  Exercises:  Exercises  Exercise 1: LS/ supine:  heel slides with overpressure 5\"x10 x 2  Exercise 2: star trac: seat 9 10 minutes L  Exercise 6: calf stretch seated with strap  5 x 30\"  Exercise 7: **focus on regain R knee extension and maximizing flexion ROM  Exercise 9: supine gravity assisted extension ankle propped on rolled towel x 5 minutes  minor rest breaks throughout  Exercise 20: HEP: passive knee extension HS with strap        *Indicates exercise, modality, or manual techniques to be initiated when appropriate    Objective Measures:         LTG 3 Current Status[de-identified] 10/2/23: deferred at this time. LTG 5 Current Status[de-identified] 10/2/23: HEP modification to remove resistance exercises at this time for decreased complications. Assessment:    Body Structures, Functions,

## 2023-10-03 ENCOUNTER — OFFICE VISIT (OUTPATIENT)
Dept: ORTHOPEDIC SURGERY | Age: 58
End: 2023-10-03

## 2023-10-03 VITALS
BODY MASS INDEX: 27.17 KG/M2 | TEMPERATURE: 97.8 F | OXYGEN SATURATION: 98 % | HEIGHT: 73 IN | WEIGHT: 205 LBS | HEART RATE: 76 BPM

## 2023-10-03 DIAGNOSIS — Z96.651 S/P REVISION OF TOTAL KNEE, RIGHT: Primary | ICD-10-CM

## 2023-10-03 PROCEDURE — 99024 POSTOP FOLLOW-UP VISIT: CPT | Performed by: PHYSICIAN ASSISTANT

## 2023-10-03 NOTE — PROGRESS NOTES
[facility]      Subjective:      Chief Complaint   Patient presents with    Post-Op Check     1st post-op after right total knee revision,Carina polyethylene; Carina persona revision knee system, He states that he is doing physical.        HPI: Brian Fam is a 63 y.o. male who is here after poly exchange of a right total knee replacement was done years back by Dr. Prince Abreu. Incision is healing well. Is working with therapy. Unfortunately there was a hernia that was induced during his resistance training with therapy. Been dealing with pain at the knee as well as the hernia ever since. Wise no complaints, no fevers chills night sweats or significant laxity of the knee. So far is happy with his results    Past Medical History:   Diagnosis Date    Arthritis       Past Surgical History:   Procedure Laterality Date    REVISION TOTAL KNEE ARTHROPLASTY Right 2023    Right total knee revision,Carina polyethylene; Carina persona revision knee system. ,Spinal. EDGAR / Sherlyn Armstrong performed by Lobo Tanner MD at [address] [place] [address] History     Socioeconomic History    Marital status: Single     Spouse name: Not on file    Number of children: Not on file    Years of education: Not on file    Highest education level: Not on file   Occupational History    Not on file   Tobacco Use    Smoking status: Former     Packs/day: 0.50     Years: 30.00     Additional pack years: 0.00     Total pack years: 15.00     Types: Cigarettes     Quit date: 2023     Years since quittin.1    Smokeless tobacco: Never   Substance and Sexual Activity    Alcohol use:  Yes     Alcohol/week: 2.0 standard drinks of alcohol     Types: 2 Cans of beer per week    Drug use: Not Currently     Types: Marijuana Adrianne Cave)     Comment: hs not don x 1 week    Sexual activity: Not on file   Other Topics Concern    Not on file   Social History Narrative    Not on file     Social Determinants of Health     Financial

## 2023-10-04 ENCOUNTER — APPOINTMENT (OUTPATIENT)
Dept: PHYSICAL THERAPY | Age: 58
End: 2023-10-04
Payer: COMMERCIAL

## 2023-10-04 DIAGNOSIS — Z96.651 S/P REVISION OF TOTAL KNEE, RIGHT: ICD-10-CM

## 2023-10-04 DIAGNOSIS — K40.90 NON-RECURRENT UNILATERAL INGUINAL HERNIA WITHOUT OBSTRUCTION OR GANGRENE: ICD-10-CM

## 2023-10-04 RX ORDER — OXYCODONE HYDROCHLORIDE AND ACETAMINOPHEN 5; 325 MG/1; MG/1
1 TABLET ORAL 2 TIMES DAILY PRN
Qty: 14 TABLET | Refills: 0 | Status: SHIPPED | OUTPATIENT
Start: 2023-10-04 | End: 2023-10-11

## 2023-10-04 RX ORDER — CYCLOBENZAPRINE HCL 10 MG
10 TABLET ORAL 2 TIMES DAILY PRN
Qty: 30 TABLET | Refills: 0 | Status: SHIPPED | OUTPATIENT
Start: 2023-10-04 | End: 2023-10-19

## 2023-10-04 NOTE — TELEPHONE ENCOUNTER
Patient called, hes very upset about being taken off his pain medication and muscle relaxers. The pain kept him up at night.  He would like to talk to you about the matter

## 2023-10-05 ENCOUNTER — HOSPITAL ENCOUNTER (OUTPATIENT)
Dept: PHYSICAL THERAPY | Age: 58
Setting detail: THERAPIES SERIES
Discharge: HOME | End: 2023-10-05
Payer: COMMERCIAL

## 2023-10-05 PROCEDURE — 97110 THERAPEUTIC EXERCISES: CPT

## 2023-10-05 PROCEDURE — 97140 MANUAL THERAPY 1/> REGIONS: CPT

## 2023-10-05 ASSESSMENT — PAIN DESCRIPTION - LOCATION: LOCATION: KNEE

## 2023-10-05 ASSESSMENT — PAIN DESCRIPTION - PAIN TYPE: TYPE: SURGICAL PAIN

## 2023-10-05 ASSESSMENT — PAIN SCALES - GENERAL: PAINLEVEL_OUTOF10: 2

## 2023-10-05 ASSESSMENT — PAIN DESCRIPTION - DESCRIPTORS: DESCRIPTORS: ACHING;TIGHTNESS

## 2023-10-05 ASSESSMENT — PAIN DESCRIPTION - ORIENTATION: ORIENTATION: RIGHT

## 2023-10-05 NOTE — PROGRESS NOTES
[facility]  Outpatient Physical Therapy    Treatment Note        Date: 10/5/2023  Patient: Rosas Gonzales  : 1965   Confirmed: Yes  MRN: [id number]  Referring Provider: Thanh Delatorre PA-C    Medical Diagnosis: Right knee pain, unspecified chronicity [M25.561]       Treatment Diagnosis: R knee pain, R knee swelling, R knee weakness, decreased R knee ROM    Visit Information:  Insurance: Payor: Vanesa Zabala / [facility] / Product Type: *No Product type* /   PT Visit Information  Onset Date: 23  PT Insurance Information: caresource  Total # of Visits Approved: 1  Total # of Visits to Date: 6  Plan of Care/Certification Expiration Date: 23  No Show: 0  Canceled Appointment: 2  Progress Note Counter:  ( 10/72 units by )    Subjective Information:  Subjective: Pt reports his hernia on the left side is causing him pain, making it difficult to sit for long periods as well as perform hip flexion. HEP Compliance:  [x] Good [] Fair [] Poor [] Reports not doing due to:    Pain Screening  Patient Currently in Pain: Yes  Pain Assessment: 0-10  Pain Level: 2  Pain Type: Surgical pain  Pain Location: Knee  Pain Orientation: Right  Pain Descriptors: Aching, Tightness    Treatment:  Exercises:  Exercises  Exercise 1: LS/ supine:  heel slides with overpressure 5\" x 15  Exercise 2: star trac: seat 9 x  5 minutes  Exercise 3: Seated Hamstring Stretch 5 x 20\" on RLE  Exercise 6: calf stretch seated with strap  5 x 15-20\"  Exercise 20: HEP: cont current       Manual:   Manual Therapy  Soft Tissue Mobilizaton: STM to distal hamstrings/IT Band (mid to distal) x  10 minutes     Objective Measures:         LTG 1 Current Status[de-identified] 10/5/23:Right knee extension lacking 5 degrees  LTG 2 Current Status[de-identified] 10/5/23: Right knee flexion 104 degrees in seated/supine          Assessment:    Body Structures, Functions, Activity Limitations Requiring Skilled Therapeutic

## 2023-10-09 ENCOUNTER — HOSPITAL ENCOUNTER (OUTPATIENT)
Dept: PHYSICAL THERAPY | Age: 58
Setting detail: THERAPIES SERIES
Discharge: HOME | End: 2023-10-09
Payer: COMMERCIAL

## 2023-10-09 PROCEDURE — 97110 THERAPEUTIC EXERCISES: CPT

## 2023-10-09 PROCEDURE — 97140 MANUAL THERAPY 1/> REGIONS: CPT

## 2023-10-09 ASSESSMENT — PAIN DESCRIPTION - LOCATION: LOCATION: KNEE

## 2023-10-09 ASSESSMENT — PAIN DESCRIPTION - PAIN TYPE: TYPE: SURGICAL PAIN

## 2023-10-09 ASSESSMENT — PAIN DESCRIPTION - DESCRIPTORS: DESCRIPTORS: ACHING;TIGHTNESS

## 2023-10-09 ASSESSMENT — PAIN SCALES - GENERAL: PAINLEVEL_OUTOF10: 2

## 2023-10-09 ASSESSMENT — PAIN DESCRIPTION - ORIENTATION: ORIENTATION: RIGHT

## 2023-10-11 ENCOUNTER — APPOINTMENT (OUTPATIENT)
Dept: PHYSICAL THERAPY | Age: 58
End: 2023-10-11
Payer: COMMERCIAL

## 2023-10-12 ENCOUNTER — HOSPITAL ENCOUNTER (OUTPATIENT)
Dept: PHYSICAL THERAPY | Age: 58
Setting detail: THERAPIES SERIES
Discharge: HOME | End: 2023-10-12
Payer: COMMERCIAL

## 2023-10-12 PROCEDURE — 97140 MANUAL THERAPY 1/> REGIONS: CPT

## 2023-10-12 PROCEDURE — 97110 THERAPEUTIC EXERCISES: CPT

## 2023-10-12 ASSESSMENT — PAIN DESCRIPTION - DESCRIPTORS: DESCRIPTORS: ACHING;TIGHTNESS

## 2023-10-12 ASSESSMENT — PAIN DESCRIPTION - ORIENTATION: ORIENTATION: RIGHT

## 2023-10-12 ASSESSMENT — PAIN DESCRIPTION - PAIN TYPE: TYPE: SURGICAL PAIN

## 2023-10-12 ASSESSMENT — PAIN DESCRIPTION - LOCATION: LOCATION: KNEE

## 2023-10-12 ASSESSMENT — PAIN SCALES - GENERAL: PAINLEVEL_OUTOF10: 2

## 2023-10-12 NOTE — PROGRESS NOTES
Functions, Activity Limitations Requiring Skilled Therapeutic Intervention: Increased pain, Decreased ROM, Decreased strength, Decreased body mechanics, Decreased high-level IADLs  Assessment: Demonstrates good tolerance with knee flexion and extension based exercises with improvements in AROM displayed this visit. Continued IT band/posterior chain STM with good relief in tightness per report. Encouraged patient to continue performing mobility exercises in HEP to improve R knee AROM and reduce lateral knee tightness. Treatment Diagnosis: R knee pain, R knee swelling, R knee weakness, decreased R knee ROM  Therapy Prognosis: Good     Post-Pain Assessment:       Pain Rating (0-10 pain scale): 0  /10   Location and pain description same as pre-treatment unless indicated. Action: [] NA   [] Perform HEP  [] Meds as prescribed  [] Modalities as prescribed   [] Call Physician     GOALS   Patient Goal(s):        Long Term Goals Completed by 6 weeks Goal Status   LTG 1 R knee extension to lacking no more than 5 deg extension to improve gait mechanics In progress, Partially met   LTG 2 R knee flexion ROM to > 105 deg to improve ability to squat, kneel, and climb ladders for work Met   LTG 3 5/5 RLE strength to allow unrestricted stair and ladder climbing for work duties In progress   LTG 4 90% daily function reported subjectively or via outcome score (LEFS). In progress   LTG 5 Independent with HEP to self manage exercises upon discharge. In progress     Plan:  Frequency/Duration:  Plan  Plan Frequency: 2  Plan weeks: 6  Current Treatment Recommendations: ROM, Strengthening, Balance training, Functional mobility training, Gait training, Stair training, Manual, Pain management, Return to work related activity, Home exercise program, Safety education & training, Patient/Caregiver education & training, Modalities  Modalities: Heat/Cold, Vasopneumatic Device  Pt to continue current HEP.   See objective section for any

## 2023-10-16 ENCOUNTER — HOSPITAL ENCOUNTER (OUTPATIENT)
Dept: PHYSICAL THERAPY | Age: 58
Setting detail: THERAPIES SERIES
Discharge: HOME | End: 2023-10-16
Payer: COMMERCIAL

## 2023-10-16 NOTE — PROGRESS NOTES
Therapy                            Cancellation/No-show Note      Date: 10/16/2023  Patient: Maryellen Walsh (73 y.o. male)  : 1965  MRN:  [id number]  Referring Physician: Saundra Koch PA-C    Medical Diagnosis: Right knee pain, unspecified chronicity [M25.561]      Visit Information:  Visits to Date 8   No Show/Cancelled Appts: 0 / 3      For today's appointment patient:  [x]  Cancelled  []  Rescheduled appointment  []  No-show   []  Called pt to remind of next appointment     Reason given by patient:  []  Patient ill  []  Conflicting appointment  []  No transportation    []  Conflict with work  []  No reason given  [x]  Other:  hernia pain    [x] Pt has future appointments scheduled, no follow up needed  [] Pt requests to be on hold.     Reason:   If > 2 weeks please discuss with therapist.  [] Therapist to call pt for follow up     Comments:       Signature: Electronically signed by Kemal Vásquez PT on 10/16/23 at 9:22 AM EDT

## 2023-10-17 ENCOUNTER — OFFICE VISIT (OUTPATIENT)
Dept: SURGERY | Age: 58
End: 2023-10-17
Payer: COMMERCIAL

## 2023-10-17 VITALS
HEART RATE: 65 BPM | RESPIRATION RATE: 18 BRPM | WEIGHT: 205 LBS | BODY MASS INDEX: 27.17 KG/M2 | HEIGHT: 73 IN | TEMPERATURE: 97.6 F

## 2023-10-17 DIAGNOSIS — K40.90 UNILATERAL INGUINAL HERNIA WITHOUT OBSTRUCTION OR GANGRENE, RECURRENCE NOT SPECIFIED: Primary | ICD-10-CM

## 2023-10-17 DIAGNOSIS — K40.90 INGUINAL HERNIA OF LEFT SIDE WITHOUT OBSTRUCTION OR GANGRENE: ICD-10-CM

## 2023-10-17 PROCEDURE — G8484 FLU IMMUNIZE NO ADMIN: HCPCS | Performed by: COLON & RECTAL SURGERY

## 2023-10-17 PROCEDURE — G8427 DOCREV CUR MEDS BY ELIG CLIN: HCPCS | Performed by: COLON & RECTAL SURGERY

## 2023-10-17 PROCEDURE — 3017F COLORECTAL CA SCREEN DOC REV: CPT | Performed by: COLON & RECTAL SURGERY

## 2023-10-17 PROCEDURE — G8417 CALC BMI ABV UP PARAM F/U: HCPCS | Performed by: COLON & RECTAL SURGERY

## 2023-10-17 PROCEDURE — 99203 OFFICE O/P NEW LOW 30 MIN: CPT | Performed by: COLON & RECTAL SURGERY

## 2023-10-17 PROCEDURE — 1036F TOBACCO NON-USER: CPT | Performed by: COLON & RECTAL SURGERY

## 2023-10-17 RX ORDER — SODIUM CHLORIDE 0.9 % (FLUSH) 0.9 %
5-40 SYRINGE (ML) INJECTION EVERY 12 HOURS SCHEDULED
Status: CANCELLED | OUTPATIENT
Start: 2023-10-17

## 2023-10-17 RX ORDER — SODIUM CHLORIDE 0.9 % (FLUSH) 0.9 %
5-40 SYRINGE (ML) INJECTION PRN
Status: CANCELLED | OUTPATIENT
Start: 2023-10-17

## 2023-10-17 RX ORDER — SODIUM CHLORIDE 9 MG/ML
INJECTION, SOLUTION INTRAVENOUS PRN
Status: CANCELLED | OUTPATIENT
Start: 2023-10-17

## 2023-10-17 ASSESSMENT — ENCOUNTER SYMPTOMS
ANAL BLEEDING: 0
ABDOMINAL PAIN: 0
CHEST TIGHTNESS: 0
DIARRHEA: 0
COLOR CHANGE: 0
SHORTNESS OF BREATH: 0
NAUSEA: 0

## 2023-10-17 NOTE — PROGRESS NOTES
Subjective:      Patient ID: Hector Greenfield is a 63 y.o. male who presents for:  Chief Complaint   Patient presents with    New Patient       This is a 69-year-old male who has a uncomfortable left inguinal hernia. This was present at the start of physical therapy for recent knee replacement. He has had a previous right inguinal hernia as well as a abdominal wall ventral hernia repair in the past.    He complains of discomfort over the bulge present on standing. Denies nausea or vomiting. Past Medical History:   Diagnosis Date    Arthritis      Past Surgical History:   Procedure Laterality Date    REVISION TOTAL KNEE ARTHROPLASTY Right 2023    Right total knee revision,Carina polyethylene; Carina persona revision knee system. ,Spinal. EDGAR / Coral Jungling performed by Lila Walls MD at [address] [place] [address] History     Socioeconomic History    Marital status: Single     Spouse name: Not on file    Number of children: Not on file    Years of education: Not on file    Highest education level: Not on file   Occupational History    Not on file   Tobacco Use    Smoking status: Former     Packs/day: 0.50     Years: 30.00     Additional pack years: 0.00     Total pack years: 15.00     Types: Cigarettes     Quit date: 2023     Years since quittin.1    Smokeless tobacco: Never   Substance and Sexual Activity    Alcohol use:  Yes     Alcohol/week: 2.0 standard drinks of alcohol     Types: 2 Cans of beer per week    Drug use: Not Currently     Types: Marijuana Jose Miguel Macadamia)     Comment: hs not don x 1 week    Sexual activity: Not on file   Other Topics Concern    Not on file   Social History Narrative    Not on file     Social Determinants of Health     Financial Resource Strain: Not on file   Food Insecurity: Not on file   Transportation Needs: Not on file   Physical Activity: Not on file   Stress: Not on file   Social Connections: Not on file   Intimate Partner Violence: Not on file   Housing Stability: Not on

## 2023-10-18 ENCOUNTER — ANESTHESIA EVENT (OUTPATIENT)
Dept: OPERATING ROOM | Age: 58
End: 2023-10-18
Payer: COMMERCIAL

## 2023-10-19 ENCOUNTER — ANESTHESIA (OUTPATIENT)
Dept: OPERATING ROOM | Age: 58
End: 2023-10-19
Payer: COMMERCIAL

## 2023-10-19 ENCOUNTER — HOSPITAL ENCOUNTER (OUTPATIENT)
Dept: PHYSICAL THERAPY | Age: 58
Setting detail: THERAPIES SERIES
Discharge: HOME | End: 2023-10-19
Payer: COMMERCIAL

## 2023-10-19 ENCOUNTER — HOSPITAL ENCOUNTER (OUTPATIENT)
Age: 58
Setting detail: OUTPATIENT SURGERY
Discharge: HOME | End: 2023-10-19
Attending: COLON & RECTAL SURGERY | Admitting: COLON & RECTAL SURGERY
Payer: COMMERCIAL

## 2023-10-19 VITALS
SYSTOLIC BLOOD PRESSURE: 142 MMHG | HEART RATE: 70 BPM | OXYGEN SATURATION: 99 % | DIASTOLIC BLOOD PRESSURE: 90 MMHG | WEIGHT: 205 LBS | TEMPERATURE: 97.4 F | BODY MASS INDEX: 27.77 KG/M2 | RESPIRATION RATE: 18 BRPM | HEIGHT: 72 IN

## 2023-10-19 DIAGNOSIS — K40.90 INGUINAL HERNIA OF LEFT SIDE WITHOUT OBSTRUCTION OR GANGRENE: Primary | ICD-10-CM

## 2023-10-19 PROCEDURE — 3600000004 HC SURGERY LEVEL 4 BASE: Performed by: COLON & RECTAL SURGERY

## 2023-10-19 PROCEDURE — 6360000002 HC RX W HCPCS: Performed by: STUDENT IN AN ORGANIZED HEALTH CARE EDUCATION/TRAINING PROGRAM

## 2023-10-19 PROCEDURE — 2500000003 HC RX 250 WO HCPCS

## 2023-10-19 PROCEDURE — 6360000002 HC RX W HCPCS: Performed by: COLON & RECTAL SURGERY

## 2023-10-19 PROCEDURE — 2580000003 HC RX 258: Performed by: STUDENT IN AN ORGANIZED HEALTH CARE EDUCATION/TRAINING PROGRAM

## 2023-10-19 PROCEDURE — A4217 STERILE WATER/SALINE, 500 ML: HCPCS | Performed by: COLON & RECTAL SURGERY

## 2023-10-19 PROCEDURE — 7100000001 HC PACU RECOVERY - ADDTL 15 MIN: Performed by: COLON & RECTAL SURGERY

## 2023-10-19 PROCEDURE — 64486 TAP BLOCK UNIL BY INJECTION: CPT | Performed by: STUDENT IN AN ORGANIZED HEALTH CARE EDUCATION/TRAINING PROGRAM

## 2023-10-19 PROCEDURE — 49505 PRP I/HERN INIT REDUC >5 YR: CPT | Performed by: COLON & RECTAL SURGERY

## 2023-10-19 PROCEDURE — 2580000003 HC RX 258: Performed by: COLON & RECTAL SURGERY

## 2023-10-19 PROCEDURE — 3700000001 HC ADD 15 MINUTES (ANESTHESIA): Performed by: COLON & RECTAL SURGERY

## 2023-10-19 PROCEDURE — 6360000002 HC RX W HCPCS

## 2023-10-19 PROCEDURE — 2500000003 HC RX 250 WO HCPCS: Performed by: COLON & RECTAL SURGERY

## 2023-10-19 PROCEDURE — C1781 MESH (IMPLANTABLE): HCPCS | Performed by: COLON & RECTAL SURGERY

## 2023-10-19 PROCEDURE — 7100000010 HC PHASE II RECOVERY - FIRST 15 MIN: Performed by: COLON & RECTAL SURGERY

## 2023-10-19 PROCEDURE — 2709999900 HC NON-CHARGEABLE SUPPLY: Performed by: COLON & RECTAL SURGERY

## 2023-10-19 PROCEDURE — 6370000000 HC RX 637 (ALT 250 FOR IP)

## 2023-10-19 PROCEDURE — 3600000014 HC SURGERY LEVEL 4 ADDTL 15MIN: Performed by: COLON & RECTAL SURGERY

## 2023-10-19 PROCEDURE — 7100000000 HC PACU RECOVERY - FIRST 15 MIN: Performed by: COLON & RECTAL SURGERY

## 2023-10-19 PROCEDURE — 3700000000 HC ANESTHESIA ATTENDED CARE: Performed by: COLON & RECTAL SURGERY

## 2023-10-19 PROCEDURE — 7100000011 HC PHASE II RECOVERY - ADDTL 15 MIN: Performed by: COLON & RECTAL SURGERY

## 2023-10-19 PROCEDURE — 6370000000 HC RX 637 (ALT 250 FOR IP): Performed by: STUDENT IN AN ORGANIZED HEALTH CARE EDUCATION/TRAINING PROGRAM

## 2023-10-19 DEVICE — MESH HERN W1.3XL1.55IN M POLYPR INGUINAL NONABSORBABLE: Type: IMPLANTABLE DEVICE | Site: GROIN | Status: FUNCTIONAL

## 2023-10-19 RX ORDER — PROPOFOL 10 MG/ML
INJECTION, EMULSION INTRAVENOUS PRN
Status: DISCONTINUED | OUTPATIENT
Start: 2023-10-19 | End: 2023-10-19 | Stop reason: SDUPTHER

## 2023-10-19 RX ORDER — SODIUM CHLORIDE 9 MG/ML
INJECTION, SOLUTION INTRAVENOUS PRN
Status: DISCONTINUED | OUTPATIENT
Start: 2023-10-19 | End: 2023-10-19 | Stop reason: HOSPADM

## 2023-10-19 RX ORDER — WOUND DRESSING ADHESIVE - LIQUID
LIQUID MISCELLANEOUS PRN
Status: DISCONTINUED | OUTPATIENT
Start: 2023-10-19 | End: 2023-10-19 | Stop reason: HOSPADM

## 2023-10-19 RX ORDER — SODIUM CHLORIDE 0.9 % (FLUSH) 0.9 %
5-40 SYRINGE (ML) INJECTION PRN
Status: DISCONTINUED | OUTPATIENT
Start: 2023-10-19 | End: 2023-10-19 | Stop reason: HOSPADM

## 2023-10-19 RX ORDER — DIPHENHYDRAMINE HYDROCHLORIDE 50 MG/ML
12.5 INJECTION INTRAMUSCULAR; INTRAVENOUS
Status: DISCONTINUED | OUTPATIENT
Start: 2023-10-19 | End: 2023-10-19 | Stop reason: HOSPADM

## 2023-10-19 RX ORDER — SODIUM CHLORIDE 0.9 % (FLUSH) 0.9 %
5-40 SYRINGE (ML) INJECTION EVERY 12 HOURS SCHEDULED
Status: DISCONTINUED | OUTPATIENT
Start: 2023-10-19 | End: 2023-10-19 | Stop reason: HOSPADM

## 2023-10-19 RX ORDER — MIDAZOLAM HYDROCHLORIDE 1 MG/ML
INJECTION INTRAMUSCULAR; INTRAVENOUS PRN
Status: DISCONTINUED | OUTPATIENT
Start: 2023-10-19 | End: 2023-10-19 | Stop reason: SDUPTHER

## 2023-10-19 RX ORDER — OXYCODONE HYDROCHLORIDE AND ACETAMINOPHEN 5; 325 MG/1; MG/1
1 TABLET ORAL EVERY 6 HOURS PRN
Qty: 12 TABLET | Refills: 0 | Status: SHIPPED | OUTPATIENT
Start: 2023-10-19 | End: 2023-10-22

## 2023-10-19 RX ORDER — ONDANSETRON 2 MG/ML
4 INJECTION INTRAMUSCULAR; INTRAVENOUS
Status: DISCONTINUED | OUTPATIENT
Start: 2023-10-19 | End: 2023-10-19 | Stop reason: HOSPADM

## 2023-10-19 RX ORDER — ONDANSETRON 2 MG/ML
INJECTION INTRAMUSCULAR; INTRAVENOUS PRN
Status: DISCONTINUED | OUTPATIENT
Start: 2023-10-19 | End: 2023-10-19 | Stop reason: SDUPTHER

## 2023-10-19 RX ORDER — OXYCODONE HYDROCHLORIDE 5 MG/1
5 TABLET ORAL
Status: COMPLETED | OUTPATIENT
Start: 2023-10-19 | End: 2023-10-19

## 2023-10-19 RX ORDER — CEFAZOLIN SODIUM 1 G/3ML
INJECTION, POWDER, FOR SOLUTION INTRAMUSCULAR; INTRAVENOUS PRN
Status: DISCONTINUED | OUTPATIENT
Start: 2023-10-19 | End: 2023-10-19 | Stop reason: HOSPADM

## 2023-10-19 RX ORDER — DEXAMETHASONE SODIUM PHOSPHATE 4 MG/ML
INJECTION, SOLUTION INTRA-ARTICULAR; INTRALESIONAL; INTRAMUSCULAR; INTRAVENOUS; SOFT TISSUE PRN
Status: DISCONTINUED | OUTPATIENT
Start: 2023-10-19 | End: 2023-10-19 | Stop reason: SDUPTHER

## 2023-10-19 RX ORDER — FENTANYL CITRATE 50 UG/ML
INJECTION, SOLUTION INTRAMUSCULAR; INTRAVENOUS PRN
Status: DISCONTINUED | OUTPATIENT
Start: 2023-10-19 | End: 2023-10-19 | Stop reason: SDUPTHER

## 2023-10-19 RX ORDER — ROPIVACAINE HYDROCHLORIDE 5 MG/ML
INJECTION, SOLUTION EPIDURAL; INFILTRATION; PERINEURAL
Status: COMPLETED | OUTPATIENT
Start: 2023-10-19 | End: 2023-10-19

## 2023-10-19 RX ORDER — FENTANYL CITRATE 0.05 MG/ML
50 INJECTION, SOLUTION INTRAMUSCULAR; INTRAVENOUS EVERY 10 MIN PRN
Status: DISCONTINUED | OUTPATIENT
Start: 2023-10-19 | End: 2023-10-19 | Stop reason: HOSPADM

## 2023-10-19 RX ORDER — LIDOCAINE HYDROCHLORIDE 20 MG/ML
INJECTION, SOLUTION INTRAVENOUS PRN
Status: DISCONTINUED | OUTPATIENT
Start: 2023-10-19 | End: 2023-10-19 | Stop reason: SDUPTHER

## 2023-10-19 RX ORDER — MAGNESIUM HYDROXIDE 1200 MG/15ML
LIQUID ORAL CONTINUOUS PRN
Status: DISCONTINUED | OUTPATIENT
Start: 2023-10-19 | End: 2023-10-19 | Stop reason: HOSPADM

## 2023-10-19 RX ORDER — ROCURONIUM BROMIDE 10 MG/ML
INJECTION, SOLUTION INTRAVENOUS PRN
Status: DISCONTINUED | OUTPATIENT
Start: 2023-10-19 | End: 2023-10-19 | Stop reason: SDUPTHER

## 2023-10-19 RX ORDER — MEPERIDINE HYDROCHLORIDE 25 MG/ML
12.5 INJECTION INTRAMUSCULAR; INTRAVENOUS; SUBCUTANEOUS
Status: DISCONTINUED | OUTPATIENT
Start: 2023-10-19 | End: 2023-10-19 | Stop reason: HOSPADM

## 2023-10-19 RX ORDER — LIDOCAINE HYDROCHLORIDE 10 MG/ML
1 INJECTION, SOLUTION EPIDURAL; INFILTRATION; INTRACAUDAL; PERINEURAL
Status: DISCONTINUED | OUTPATIENT
Start: 2023-10-19 | End: 2023-10-19 | Stop reason: HOSPADM

## 2023-10-19 RX ORDER — KETOROLAC TROMETHAMINE 30 MG/ML
INJECTION, SOLUTION INTRAMUSCULAR; INTRAVENOUS PRN
Status: DISCONTINUED | OUTPATIENT
Start: 2023-10-19 | End: 2023-10-19 | Stop reason: SDUPTHER

## 2023-10-19 RX ORDER — METOCLOPRAMIDE HYDROCHLORIDE 5 MG/ML
10 INJECTION INTRAMUSCULAR; INTRAVENOUS
Status: DISCONTINUED | OUTPATIENT
Start: 2023-10-19 | End: 2023-10-19 | Stop reason: HOSPADM

## 2023-10-19 RX ADMIN — PROPOFOL 200 MG: 10 INJECTION, EMULSION INTRAVENOUS at 07:48

## 2023-10-19 RX ADMIN — SUGAMMADEX 200 MG: 100 INJECTION, SOLUTION INTRAVENOUS at 08:44

## 2023-10-19 RX ADMIN — FENTANYL CITRATE 25 MCG: 50 INJECTION, SOLUTION INTRAMUSCULAR; INTRAVENOUS at 07:47

## 2023-10-19 RX ADMIN — ROPIVACAINE HYDROCHLORIDE 30 ML: 5 INJECTION, SOLUTION EPIDURAL; INFILTRATION; PERINEURAL at 07:51

## 2023-10-19 RX ADMIN — FENTANYL CITRATE 50 MCG: 50 INJECTION INTRAMUSCULAR; INTRAVENOUS at 09:30

## 2023-10-19 RX ADMIN — SODIUM CHLORIDE 100 ML/HR: 9 INJECTION, SOLUTION INTRAVENOUS at 06:37

## 2023-10-19 RX ADMIN — HYDROMORPHONE HYDROCHLORIDE 0.5 MG: 1 INJECTION, SOLUTION INTRAMUSCULAR; INTRAVENOUS; SUBCUTANEOUS at 09:16

## 2023-10-19 RX ADMIN — ROCURONIUM BROMIDE 5 MG: 10 INJECTION, SOLUTION INTRAVENOUS at 08:30

## 2023-10-19 RX ADMIN — ROCURONIUM BROMIDE 10 MG: 10 INJECTION, SOLUTION INTRAVENOUS at 08:13

## 2023-10-19 RX ADMIN — DEXAMETHASONE SODIUM PHOSPHATE 8 MG: 4 INJECTION INTRA-ARTICULAR; INTRALESIONAL; INTRAMUSCULAR; INTRAVENOUS; SOFT TISSUE at 07:54

## 2023-10-19 RX ADMIN — CEFAZOLIN 2000 MG: 2 INJECTION, POWDER, FOR SOLUTION INTRAMUSCULAR; INTRAVENOUS at 07:53

## 2023-10-19 RX ADMIN — FENTANYL CITRATE 25 MCG: 50 INJECTION, SOLUTION INTRAMUSCULAR; INTRAVENOUS at 07:52

## 2023-10-19 RX ADMIN — KETOROLAC TROMETHAMINE 30 MG: 30 INJECTION, SOLUTION INTRAMUSCULAR; INTRAVENOUS at 08:38

## 2023-10-19 RX ADMIN — SODIUM CHLORIDE 1000 ML: 9 INJECTION, SOLUTION INTRAVENOUS at 09:18

## 2023-10-19 RX ADMIN — ROCURONIUM BROMIDE 50 MG: 10 INJECTION, SOLUTION INTRAVENOUS at 07:49

## 2023-10-19 RX ADMIN — FENTANYL CITRATE 25 MCG: 50 INJECTION, SOLUTION INTRAMUSCULAR; INTRAVENOUS at 08:44

## 2023-10-19 RX ADMIN — FENTANYL CITRATE 25 MCG: 50 INJECTION, SOLUTION INTRAMUSCULAR; INTRAVENOUS at 08:27

## 2023-10-19 RX ADMIN — ONDANSETRON 4 MG: 2 INJECTION INTRAMUSCULAR; INTRAVENOUS at 08:38

## 2023-10-19 RX ADMIN — LIDOCAINE HYDROCHLORIDE 40 MG: 20 INJECTION, SOLUTION INTRAVENOUS at 07:48

## 2023-10-19 RX ADMIN — HYDROMORPHONE HYDROCHLORIDE 0.5 MG: 1 INJECTION, SOLUTION INTRAMUSCULAR; INTRAVENOUS; SUBCUTANEOUS at 09:01

## 2023-10-19 RX ADMIN — MIDAZOLAM HYDROCHLORIDE 2 MG: 1 INJECTION, SOLUTION INTRAMUSCULAR; INTRAVENOUS at 07:42

## 2023-10-19 RX ADMIN — OXYCODONE HYDROCHLORIDE 5 MG: 5 TABLET ORAL at 09:50

## 2023-10-19 ASSESSMENT — PAIN SCALES - GENERAL
PAINLEVEL_OUTOF10: 5
PAINLEVEL_OUTOF10: 7
PAINLEVEL_OUTOF10: 4
PAINLEVEL_OUTOF10: 5
PAINLEVEL_OUTOF10: 3
PAINLEVEL_OUTOF10: 5
PAINLEVEL_OUTOF10: 7

## 2023-10-19 ASSESSMENT — PAIN DESCRIPTION - ORIENTATION
ORIENTATION: LEFT

## 2023-10-19 ASSESSMENT — PAIN - FUNCTIONAL ASSESSMENT
PAIN_FUNCTIONAL_ASSESSMENT: 0-10
PAIN_FUNCTIONAL_ASSESSMENT: PREVENTS OR INTERFERES SOME ACTIVE ACTIVITIES AND ADLS

## 2023-10-19 ASSESSMENT — PAIN DESCRIPTION - LOCATION
LOCATION: GROIN

## 2023-10-19 ASSESSMENT — PAIN DESCRIPTION - DESCRIPTORS: DESCRIPTORS: TIGHTNESS

## 2023-10-19 NOTE — BRIEF OP NOTE
Brief Postoperative Note      Patient: Bianca Ellis  YOB: 1965  MRN: [id number]    Date of Procedure: 10/19/2023    Pre-Op Diagnosis Codes:     * Inguinal hernia of left side without obstruction or gangrene [K40.90]    Post-Op Diagnosis: Same       Procedure(s):  Left inguinal hernia repair with mesh    Surgeon(s): Love Olivo MD    Assistant:  First Assistant: Ian Russ    Anesthesia: General, left tap block    Estimated Blood Loss (mL): 25    Complications: None    Specimens:   * No specimens in log *    Implants:  Implant Name Type Inv. Item Serial No.  Lot No. LRB No. Used Action   MESH SEBASTIÁN W1.3XL1. 55IN M POLYPR INGUINAL NONABSORBABLE - [id number]  MESH SEBASTIÁN W1.3XL1. 55IN M POLYPR INGUINAL NONABSORBABLE  BARD DAVOL-WD [id number] Left 1 Implanted         Drains: * No LDAs found *    Findings: Indirect left inguinal hernia repaired with high ligation and medium mesh plug      Electronically signed by Love Olivo MD on 10/19/2023 at 8:37 AM

## 2023-10-19 NOTE — PROGRESS NOTES
Late entry for 10:10 Reviewed discharge instructions with verbal understanding and all questions answered.

## 2023-10-19 NOTE — PROGRESS NOTES
Therapy                            Cancellation/No-show Note      Date: 10/19/2023  Patient: Lalit Cain (89 y.o. male)  : 1965  MRN:  [id number]  Referring Physician: Angela Woodard PA-C    Medical Diagnosis: Right knee pain, unspecified chronicity [M25.561]      Visit Information:  Visits to Date 8   No Show/Cancelled Appts: 0 / 4      For today's appointment patient:  [x]  Cancelled  []  Rescheduled appointment  []  No-show   []  Called pt to remind of next appointment     Reason given by patient:  []  Patient ill  []  Conflicting appointment  []  No transportation    []  Conflict with work  []  No reason given  [x]  Other:  sx on hernia    [] Pt has future appointments scheduled, no follow up needed  [x] Pt requests to be on hold.     Reason:   If > 2 weeks please discuss with therapist.  [] Therapist to call pt for follow up     Comments:   Pt will call to reschedule once cleared    Signature: Electronically signed by Vahid Pinto PTA on 10/19/23 at 3:38 PM EDT

## 2023-10-19 NOTE — DISCHARGE INSTRUCTIONS
Dressing x48 hours    Steri-Strips until office    May shower 48 hours    No lifting greater than 10 pounds for the next week    No driving while on pain medication    May take stairs

## 2023-10-19 NOTE — ANESTHESIA PROCEDURE NOTES
Peripheral Block    Patient location during procedure: OR  Reason for block: post-op pain management and at surgeon's request  Start time: 10/19/2023 7:51 AM  End time: 10/19/2023 7:56 AM  Staffing  Performed: anesthesiologist   Anesthesiologist: Clari Henry DO  Performed by: Clari Henry DO  Authorized by: Clari Henry DO    Preanesthetic Checklist  Completed: patient identified, IV checked, site marked, risks and benefits discussed, surgical/procedural consents, equipment checked, pre-op evaluation, timeout performed, anesthesia consent given, oxygen available and monitors applied/VS acknowledged  Peripheral Block   Patient position: supine  Prep: ChloraPrep  Provider prep: mask and sterile gloves  Patient monitoring: cardiac monitor, continuous pulse ox, frequent blood pressure checks and IV access  Block type: TAP  Laterality: left  Injection technique: single-shot  Guidance: ultrasound guided  Local infiltration: ropivacaine  Infiltration strength: 0.5 %  Local infiltration: ropivacaine  Dose: 30 mL    Needle   Needle type: combined needle/nerve stimulator   Needle gauge: 22 G  Needle localization: anatomical landmarks and ultrasound guidance  Needle length: 10 cm  Assessment   Injection assessment: negative aspiration for heme, no paresthesia on injection and local visualized surrounding nerve on ultrasound  Paresthesia pain: immediately resolved  Slow fractionated injection: yes  Hemodynamics: stable  Real-time US image taken/store: yes    Additional Notes  Ultrasound image printed and saved in patient chart.     Sterile probe cover used    Ropivacaine 0.5% 30 ml + saline 10 ml        Medications Administered  ropivacaine (NAROPIN) injection 0.5% - Perineural   30 mL - 10/19/2023 7:51:00 AM

## 2023-10-19 NOTE — OP NOTE
position down  to the internal ring. The sac was opened and sutured and ligated in a  high-fashion with 0 Vicryl suture. A median mesh plug was used to reinforce the transversalis fascia. It  was sutured in several location using interrupted 0 Vicryl and 0 Prolene  suture. It was sutured loosely to the fascia of the internal oblique,  the shelving edge of the inguinal ligament and the periosteum of the  pubic tubercle. Upon completion, the mesh repair was complete and intact under no  tension. Lap, needle, and instrument counts were all correct. External oblique  was closed using a running 2-0 Vicryl suture. Mich's fascia was  closed using a running 3-0 Vicryl suture. The skin was closed with a  4-0 Monocryl in a subcuticular fashion. Following completion, the lap, needle, and instrument counts were all  correct. Testicles were palpated in the scrotum under no tension. Dressings were applied. The patient was extubated and taken to Recovery  for postop monitoring.         Heydi Pereira MD    D: 10/19/2023 8:43:20       T: 10/19/2023 13:32:20     [email or web address]  Job#: [id number]     Doc#: [id number]    CC:

## 2023-10-19 NOTE — ANESTHESIA POSTPROCEDURE EVALUATION
Department of Anesthesiology  Postprocedure Note    Patient: Daquan Tello  MRN: [id number]  YOB: 1965  Date of evaluation: 10/19/2023      Procedure Summary     Date: 10/19/23 Room / Location: [address]    Anesthesia Start: 3340 Anesthesia Stop: 3990    Procedure: Left inguinal hernia repair with mesh (Left: Groin) Diagnosis:       Inguinal hernia of left side without obstruction or gangrene      (Inguinal hernia of left side without obstruction or gangrene [K40.90])    Surgeons: Jefe Obrien MD Responsible Provider: Luz Armstrong DO    Anesthesia Type: general, regional ASA Status: 2          Anesthesia Type: No value filed.     Sarah Phase I: Sarah Score: 10    Sarah Phase II:        Anesthesia Post Evaluation    Patient location during evaluation: PACU  Patient participation: complete - patient participated  Level of consciousness: awake and alert  Airway patency: patent  Nausea & Vomiting: no nausea and no vomiting  Complications: no  Cardiovascular status: blood pressure returned to baseline and hemodynamically stable  Respiratory status: acceptable  Hydration status: euvolemic  Pain management: adequate

## 2023-10-20 ENCOUNTER — TELEPHONE (OUTPATIENT)
Dept: ORTHOPEDIC SURGERY | Age: 58
End: 2023-10-20

## 2023-10-23 ENCOUNTER — TELEPHONE (OUTPATIENT)
Dept: SURGERY | Age: 58
End: 2023-10-23

## 2023-10-23 ENCOUNTER — TELEPHONE (OUTPATIENT)
Dept: ORTHOPEDIC SURGERY | Age: 58
End: 2023-10-23

## 2023-10-23 DIAGNOSIS — K40.90 UNILATERAL INGUINAL HERNIA WITHOUT OBSTRUCTION OR GANGRENE, RECURRENCE NOT SPECIFIED: Primary | ICD-10-CM

## 2023-10-23 RX ORDER — CYCLOBENZAPRINE HCL 10 MG
10 TABLET ORAL 2 TIMES DAILY PRN
Qty: 60 TABLET | Refills: 0 | Status: SHIPPED | OUTPATIENT
Start: 2023-10-23 | End: 2023-11-22

## 2023-10-23 RX ORDER — OXYCODONE HYDROCHLORIDE AND ACETAMINOPHEN 5; 325 MG/1; MG/1
1 TABLET ORAL EVERY 6 HOURS PRN
Qty: 12 TABLET | Refills: 0 | Status: SHIPPED | OUTPATIENT
Start: 2023-10-23 | End: 2023-10-26

## 2023-10-23 NOTE — TELEPHONE ENCOUNTER
Patient called requesting more pain medication. Patient is also requesting clearance to start physical therapy for his knee starting next week.

## 2023-10-23 NOTE — TELEPHONE ENCOUNTER
Called the patient. He states the pain is from his hernia surgery not from his knee. He has a call into Dr. Maliha Garcia. I refilled his muscle relaxants. Told him I would like him to restart physical therapy next week.

## 2023-10-27 ENCOUNTER — OFFICE VISIT (OUTPATIENT)
Dept: SURGERY | Age: 58
End: 2023-10-27

## 2023-10-27 VITALS — HEIGHT: 72 IN | TEMPERATURE: 97.6 F | BODY MASS INDEX: 27.09 KG/M2 | WEIGHT: 200 LBS

## 2023-10-27 DIAGNOSIS — K40.90 UNILATERAL INGUINAL HERNIA WITHOUT OBSTRUCTION OR GANGRENE, RECURRENCE NOT SPECIFIED: Primary | ICD-10-CM

## 2023-10-27 PROCEDURE — 99024 POSTOP FOLLOW-UP VISIT: CPT | Performed by: COLON & RECTAL SURGERY

## 2023-10-27 NOTE — PROGRESS NOTES
Subjective:      Patient ID: Wilda Foote is a 63 y.o. male who presents for:  Chief Complaint   Patient presents with    Post-Op Check       He returns to the office following a left inguinal hernia repair with mesh. He has no concerns or problems. He is eating well. Denies fever. Past Medical History:   Diagnosis Date    Arthritis      Past Surgical History:   Procedure Laterality Date    HERNIA REPAIR Left 10/19/2023    Left inguinal hernia repair with mesh performed by Neo Mendoza MD at [address] [place] Right 2023    Right total knee revision,Carina polyethylene; Carina persona revision knee system. ,Spinal. EDGAR / Justin Faulkner performed by Fadi Wesley MD at [address] History     Socioeconomic History    Marital status: Single     Spouse name: Not on file    Number of children: Not on file    Years of education: Not on file    Highest education level: Not on file   Occupational History    Not on file   Tobacco Use    Smoking status: Former     Packs/day: 0.50     Years: 30.00     Additional pack years: 0.00     Total pack years: 15.00     Types: Cigarettes     Quit date: 2023     Years since quittin.2    Smokeless tobacco: Never   Substance and Sexual Activity    Alcohol use: Yes     Alcohol/week: 2.0 standard drinks of alcohol     Types: 2 Cans of beer per week    Drug use: Not Currently     Types: Marijuana Salli Endo)     Comment: hs not don x 1 week    Sexual activity: Not on file   Other Topics Concern    Not on file   Social History Narrative    Not on file     Social Determinants of Health     Financial Resource Strain: Not on file   Food Insecurity: Not on file   Transportation Needs: Not on file   Physical Activity: Not on file   Stress: Not on file   Social Connections: Not on file   Intimate Partner Violence: Not on file   Housing Stability: Not on file     No family history on file.   Allergies:  Bee venom and Tramadol    Review of

## 2023-10-30 ENCOUNTER — HOSPITAL ENCOUNTER (OUTPATIENT)
Dept: PHYSICAL THERAPY | Age: 58
Setting detail: THERAPIES SERIES
Discharge: HOME | End: 2023-10-30
Payer: COMMERCIAL

## 2023-10-30 NOTE — PROGRESS NOTES
Therapy                            Cancellation/No-show Note      Date: 10/30/2023  Patient: Michael Pyle (53 y.o. male)  : 1965  MRN:  [id number]  Referring Physician: Linda Lowery PA-C    Medical Diagnosis: Right knee pain, unspecified chronicity [M25.561]      Visit Information:  Visits to Date 8   No Show/Cancelled Appts: 0 / 5      For today's appointment patient:  [x]  Cancelled  [x]  Rescheduled appointment  []  No-show   []  Called pt to remind of next appointment     Reason given by patient:  []  Patient ill  [x]  Conflicting appointment  []  No transportation    []  Conflict with work  []  No reason given  []  Other:      [x] Pt has future appointments scheduled, no follow up needed  [] Pt requests to be on hold.     Reason:   If > 2 weeks please discuss with therapist.  [] Therapist to call pt for follow up     Comments:   appt rescheduled for 10/31/23    Signature: Electronically signed by Lashonda Baker PT on 10/30/23 at 9:28 AM EDT

## 2023-10-31 ENCOUNTER — HOSPITAL ENCOUNTER (OUTPATIENT)
Dept: PHYSICAL THERAPY | Age: 58
Setting detail: THERAPIES SERIES
Discharge: HOME | End: 2023-10-31
Payer: COMMERCIAL

## 2023-10-31 NOTE — PROGRESS NOTES
Therapy                            Cancellation/No-show Note      Date: 10/31/2023  Patient: Michael Arita (14 y.o. male)  : 1965  MRN:  [id number]  Referring Physician: Jluis Talley PA-C    Medical Diagnosis: Right knee pain, unspecified chronicity [M25.561]      Visit Information:  Visits to Date 8   No Show/Cancelled Appts: 0 / 6      For today's appointment patient:  [x]  Cancelled  []  Rescheduled appointment  []  No-show   []  Called pt to remind of next appointment     Reason given by patient:  []  Patient ill  []  Conflicting appointment  []  No transportation    []  Conflict with work  []  No reason given  [x]  Other:  \"forgot about appt\"    [x] Pt has future appointments scheduled, no follow up needed  [] Pt requests to be on hold. Reason:   If > 2 weeks please discuss with therapist.  [] Therapist to call pt for follow up     Comments:   pt would like a call if something later in the day becomes available.     Signature: Electronically signed by Marcelle Bell PTA on 10/31/23 at 8:49 AM EDT

## 2023-11-01 ENCOUNTER — HOSPITAL ENCOUNTER (OUTPATIENT)
Dept: PHYSICAL THERAPY | Age: 58
Setting detail: THERAPIES SERIES
Discharge: HOME | End: 2023-11-01
Payer: COMMERCIAL

## 2023-11-01 PROCEDURE — 97110 THERAPEUTIC EXERCISES: CPT

## 2023-11-01 ASSESSMENT — PAIN SCALES - GENERAL: PAINLEVEL_OUTOF10: 3

## 2023-11-01 ASSESSMENT — PAIN DESCRIPTION - ORIENTATION: ORIENTATION: RIGHT

## 2023-11-01 ASSESSMENT — PAIN DESCRIPTION - PAIN TYPE: TYPE: SURGICAL PAIN

## 2023-11-01 ASSESSMENT — PAIN DESCRIPTION - LOCATION: LOCATION: KNEE

## 2023-11-01 ASSESSMENT — PAIN DESCRIPTION - DESCRIPTORS: DESCRIPTORS: TINGLING;NUMBNESS

## 2023-11-01 NOTE — PROGRESS NOTES
[address] [facility]  PHYSICAL THERAPY PLAN OF CARE   [address] [facility] - [place], [address] [facility]         Phone: [phone]  Fax: [phone]    [] Certification  [] Recertification []  Plan of Care  [] Progress Note [x] Discharge      Referring Provider: Saundra Koch PA-C     From:  Damian Pop, PT, DPT  Patient: Maryellen Walsh (63 y.o. male) : 1965 Date: 2023  Medical Diagnosis: Right knee pain, unspecified chronicity [M25.561]       Treatment Diagnosis: R knee pain, R knee swelling, R knee weakness, decreased R knee ROM    Plan of Care/Certification Expiration Date: 23   Progress Report Period from:  2023  to 2023    Visits to Date: 9 No Show: 0 Cancelled Appts: 6    OBJECTIVE:   Long Term Goals - Time Frame for Long Term Goals : 6 weeks  Goals Current/ Discharge status Status   Long Term Goal 1: R knee extension to lacking no more than 5 deg extension to improve gait mechanics LTG 1 Current Status[de-identified] 23:Right knee extension: 0-1 degrees   Met   Long Term Goal 2: R knee flexion ROM to > 105 deg to improve ability to squat, kneel, and climb ladders for work LTG 2 Current Status[de-identified] 23: Right knee flexion 110 degrees supine   Met   Long Term Goal 3: 5/5 RLE strength to allow unrestricted stair and ladder climbing for work duties LTG 3 Current Status[de-identified] 23: RLE : 5/5   Met   Long Term Goal 4: 90% daily function reported subjectively or via outcome score (LEFS). LTG 4 Current Status[de-identified] 23: LEFS: 53/80   Not Met   Long Term Goal 5: Independent with HEP to self manage exercises upon discharge. LTG 5 Current Status[de-identified] 23: Pt has maintained good compliance with HEP since last visit on 10/12/23.    Met     Body Structures, Functions, Activity Limitations Requiring Skilled Therapeutic Intervention: Increased pain, Decreased ROM, Decreased strength, Decreased body mechanics, Decreased high-level IADLs  Assessment: Pt returns after almost 3 week

## 2023-11-01 NOTE — PROGRESS NOTES
[address] [facility]  Outpatient Physical Therapy    Treatment Note        Date: 2023  Patient: Guera Maguire  : 1965   Confirmed: Yes  MRN: [id number]  Referring Provider: Jelena Nielsen PA-C    Medical Diagnosis: Right knee pain, unspecified chronicity [M25.561]       Treatment Diagnosis: R knee pain, R knee swelling, R knee weakness, decreased R knee ROM    Visit Information:  Insurance: Payor: Ian Christopher / Plan: Alejandro Suzi / Product Type: *No Product type* /   PT Visit Information  Total # of Visits Approved:  (72 units)  Total # of Visits to Date: 9  Plan of Care/Certification Expiration Date: 23  No Show: 0  Canceled Appointment: 6  Progress Note Counter:  (72 units by )    Subjective Information:  Subjective: Pt returns to therapy after having hernia surgery. Says R knee pain continues to be on the lateral side of the R knee and across the anterior knee. HEP Compliance:  [x] Good [] Fair [] Poor [] Reports not doing due to:    Pain Screening  Patient Currently in Pain: Yes  Pain Assessment: 0-10  Pain Level: 3  Pain Type: Surgical pain  Pain Location: Knee  Pain Orientation: Right  Pain Descriptors: Tingling, Numbness    Treatment:  Exercises:  Exercises  Exercise 1: Supine heel slides x 15 -5s with strap  Exercise 2: Standing Calf Stretch 5 x 20\"  Exercise 3: IT band stretch on RLE (standing/sidelying) 3 x 20\" ea  Exercise 4: Stairs 4 steps x 3 (1 flight)  Exercise 20: HEP: heel slides, calf stretch, IT stretch   Objective Measures:         LTG 1 Current Status[de-identified] 23:Right knee extension: 0-1 degrees  LTG 2 Current Status[de-identified] 23: Right knee flexion 110 degrees supine  LTG 3 Current Status[de-identified] 23: RLE : 5/5  LTG 4 Current Status[de-identified] 23: LEFS: 53/80  LTG 5 Current Status[de-identified] 23: Pt has maintained good compliance with HEP since last visit on 10/12/23. Assessment:    Body Structures, Functions, Activity Limitations Requiring

## 2023-11-02 ENCOUNTER — APPOINTMENT (OUTPATIENT)
Dept: PHYSICAL THERAPY | Age: 58
End: 2023-11-02
Payer: COMMERCIAL

## 2023-11-07 ENCOUNTER — OFFICE VISIT (OUTPATIENT)
Dept: ORTHOPEDIC SURGERY | Age: 58
End: 2023-11-07

## 2023-11-07 VITALS
HEIGHT: 72 IN | TEMPERATURE: 97.3 F | BODY MASS INDEX: 27.09 KG/M2 | HEART RATE: 99 BPM | OXYGEN SATURATION: 99 % | WEIGHT: 200 LBS

## 2023-11-07 DIAGNOSIS — Z96.651 S/P REVISION OF TOTAL KNEE, RIGHT: Primary | ICD-10-CM

## 2023-11-07 DIAGNOSIS — K40.90 INGUINAL HERNIA OF LEFT SIDE WITHOUT OBSTRUCTION OR GANGRENE: ICD-10-CM

## 2023-11-07 PROCEDURE — 99024 POSTOP FOLLOW-UP VISIT: CPT | Performed by: PHYSICIAN ASSISTANT

## 2023-11-07 RX ORDER — CYCLOBENZAPRINE HCL 10 MG
10 TABLET ORAL 2 TIMES DAILY PRN
Qty: 60 TABLET | Refills: 0 | Status: SHIPPED | OUTPATIENT
Start: 2023-11-07 | End: 2023-12-07

## 2023-11-07 NOTE — PROGRESS NOTES
Narrative Referring Provider:   Rosa Elena Hickman MD      PCP:   No primary care provider on file.    ============================  IMPRESSION/PLAN:  ============================  Lalit Cain is s/p left Total knee revision completed on 2023. IMPRESSION: At normal postoperative stage of recovery    PLAN:  No new treatment indicated. and Rest, Ice, Compression, Elevation PRN. Routine follow-up. Ice compression and elevation  Patient Reassurance: Normal post-operative course discussed with patient. Patient reassured and supported. All questions answered. Follow [id number]  No X-Rays Needed    Patient presents today for a a routine 1st post-op visit    Status post op:  right Total knee revision     BMI: There is no height or weight on file to calculate BMI. Post-operative recovery was complicated by uneventful/none. Patient rates their condition as improving. Does the patient still experience pain? 2/10 pain, aching    Post Op discharge patient location: in home. Functional Assessment is as follows: has commenced to begin outpatient PT. Functional difficulties: None. Pain Medication: Tylenol,  Tylenol  Currently Ambulating with:  no ambulatory aids    =================================  EXAM: POST OP KNEE  =================================  RightPost-Operative Knee    Ambulates with a limp favoring the Right    SKIN: Appropriate postop appearance, No evidence of erythema, warmth, discharge or drainage and Incision clean/dry/intact. Range of motion is 0 degrees in extension and   110 degrees of flexion. Extension La degrees    Pain with ROM: Yes with deeper flexion    There is mild effusion.      Mal-alignment: No    Tender to the palpation of Lateralfemoral condyle and Medial joint line    Neurovascular Status: Sensation Intact, Moves foot and ankle up & down, 2+ dorsalis pedis and negative homans sign    Stability:Varus/Valgus- Yes, stable    Quad strength: improving    Imaging:

## 2023-11-20 ENCOUNTER — TELEPHONE (OUTPATIENT)
Dept: ORTHOPEDIC SURGERY | Age: 58
End: 2023-11-20
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
## 2023-12-06 NOTE — PROGRESS NOTES
Narrative Referring Provider:   Yolanda Alvarado      PCP:   No primary care provider on file.    ============================  IMPRESSION/PLAN:  ============================  Harriet Tovar is s/p right Total knee revision completed on 23. IMPRESSION: No complaints or limitations and At normal postoperative stage of recovery    PLAN:  No new treatment indicated. Routine follow-up. Ice compression and elevation  Patient Reassurance: Normal post-operative course discussed with patient. Patient reassured and supported. All questions answered. Follow up 2024 X-Rays Needed    Patient presents today for the intermediate post-op visit    Status post op:  right Total knee revision     BMI: There is no height or weight on file to calculate BMI. Post-operative recovery was complicated by uneventful/none. Patient rates their condition as improving. Does the patient still experience pain? 2/10 pain, aching    Post Op discharge patient location: in home. Functional Assessment is as follows: Supervised therapy is completed. Functional difficulties: None. Pain Medication: None  Currently Ambulating with: No assistive devices    =================================  EXAM: POST OP KNEE  =================================  Right Post-Operative Knee    Ambulates with a normal gait. SKIN: Appropriate postop appearance, No evidence of erythema, warmth, discharge or drainage and Incision clean/dry/intact. Range of motion is 0 degrees in extension and   113 degrees of flexion. Extension La degrees    Pain with ROM: Yes with deeper flexion    There is Mild effusion.      Mal-alignment: No    Tender to the palpation of lateral joint line    Neurovascular Status: Sensation Intact, Moves foot and ankle up & down, 2+ dorsalis pedis and negative homans sign    Stability:Varus/Valgus- Yes, stable    Quad strength: improving    Imaging:  None today    Provider:   Brendan Martin MD

## 2023-12-07 ENCOUNTER — OFFICE VISIT (OUTPATIENT)
Dept: ORTHOPEDIC SURGERY | Age: 58
End: 2023-12-07

## 2023-12-07 VITALS
TEMPERATURE: 98.7 F | WEIGHT: 200 LBS | OXYGEN SATURATION: 100 % | HEIGHT: 72 IN | BODY MASS INDEX: 27.09 KG/M2 | HEART RATE: 103 BPM

## 2023-12-07 DIAGNOSIS — Z96.651 PRESENCE OF TOTAL KNEE JOINT PROSTHESIS, RIGHT: Primary | ICD-10-CM

## 2024-07-02 ENCOUNTER — HOSPITAL ENCOUNTER (EMERGENCY)
Facility: HOSPITAL | Age: 59
Discharge: HOME | End: 2024-07-02
Payer: COMMERCIAL

## 2024-07-02 VITALS
OXYGEN SATURATION: 98 % | DIASTOLIC BLOOD PRESSURE: 88 MMHG | WEIGHT: 225 LBS | RESPIRATION RATE: 16 BRPM | HEART RATE: 70 BPM | TEMPERATURE: 98.2 F | HEIGHT: 72 IN | BODY MASS INDEX: 30.48 KG/M2 | SYSTOLIC BLOOD PRESSURE: 153 MMHG

## 2024-07-02 DIAGNOSIS — Z04.1 EXAM FOLLOWING MVC (MOTOR VEHICLE COLLISION), NO APPARENT INJURY: Primary | ICD-10-CM

## 2024-07-02 PROCEDURE — 99281 EMR DPT VST MAYX REQ PHY/QHP: CPT

## 2024-07-02 ASSESSMENT — LIFESTYLE VARIABLES
TOTAL SCORE: 0
HAVE YOU EVER FELT YOU SHOULD CUT DOWN ON YOUR DRINKING: NO
EVER FELT BAD OR GUILTY ABOUT YOUR DRINKING: NO
EVER HAD A DRINK FIRST THING IN THE MORNING TO STEADY YOUR NERVES TO GET RID OF A HANGOVER: NO
HAVE PEOPLE ANNOYED YOU BY CRITICIZING YOUR DRINKING: NO

## 2024-07-02 ASSESSMENT — PAIN SCALES - GENERAL: PAINLEVEL_OUTOF10: 0 - NO PAIN

## 2024-07-02 ASSESSMENT — COLUMBIA-SUICIDE SEVERITY RATING SCALE - C-SSRS
1. IN THE PAST MONTH, HAVE YOU WISHED YOU WERE DEAD OR WISHED YOU COULD GO TO SLEEP AND NOT WAKE UP?: NO
6. HAVE YOU EVER DONE ANYTHING, STARTED TO DO ANYTHING, OR PREPARED TO DO ANYTHING TO END YOUR LIFE?: NO
2. HAVE YOU ACTUALLY HAD ANY THOUGHTS OF KILLING YOURSELF?: NO

## 2024-07-02 ASSESSMENT — PAIN - FUNCTIONAL ASSESSMENT: PAIN_FUNCTIONAL_ASSESSMENT: 0-10
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
## 2024-10-27 ENCOUNTER — HOSPITAL ENCOUNTER (EMERGENCY)
Facility: HOSPITAL | Age: 59
Discharge: HOME | End: 2024-10-27
Payer: COMMERCIAL

## 2024-10-27 ENCOUNTER — APPOINTMENT (OUTPATIENT)
Dept: RADIOLOGY | Facility: HOSPITAL | Age: 59
End: 2024-10-27
Payer: COMMERCIAL

## 2024-10-27 VITALS
DIASTOLIC BLOOD PRESSURE: 75 MMHG | HEIGHT: 72 IN | SYSTOLIC BLOOD PRESSURE: 124 MMHG | OXYGEN SATURATION: 99 % | HEART RATE: 67 BPM | RESPIRATION RATE: 16 BRPM | BODY MASS INDEX: 27.09 KG/M2 | TEMPERATURE: 97.7 F | WEIGHT: 200 LBS

## 2024-10-27 DIAGNOSIS — K11.20 PAROTITIS: Primary | ICD-10-CM

## 2024-10-27 LAB
ALBUMIN SERPL BCP-MCNC: 4.5 G/DL (ref 3.4–5)
ALP SERPL-CCNC: 56 U/L (ref 33–120)
ALT SERPL W P-5'-P-CCNC: 22 U/L (ref 10–52)
AMYLASE SERPL-CCNC: 99 U/L (ref 29–103)
ANION GAP SERPL CALC-SCNC: 9 MMOL/L (ref 10–20)
AST SERPL W P-5'-P-CCNC: 19 U/L (ref 9–39)
BASOPHILS # BLD AUTO: 0.05 X10*3/UL (ref 0–0.1)
BASOPHILS NFR BLD AUTO: 0.7 %
BILIRUB SERPL-MCNC: 0.6 MG/DL (ref 0–1.2)
BUN SERPL-MCNC: 12 MG/DL (ref 6–23)
CALCIUM SERPL-MCNC: 9.6 MG/DL (ref 8.6–10.3)
CHLORIDE SERPL-SCNC: 105 MMOL/L (ref 98–107)
CO2 SERPL-SCNC: 29 MMOL/L (ref 21–32)
CREAT SERPL-MCNC: 1.04 MG/DL (ref 0.5–1.3)
EGFRCR SERPLBLD CKD-EPI 2021: 83 ML/MIN/1.73M*2
EOSINOPHIL # BLD AUTO: 0.19 X10*3/UL (ref 0–0.7)
EOSINOPHIL NFR BLD AUTO: 2.5 %
ERYTHROCYTE [DISTWIDTH] IN BLOOD BY AUTOMATED COUNT: 13.2 % (ref 11.5–14.5)
GLUCOSE SERPL-MCNC: 98 MG/DL (ref 74–99)
HCT VFR BLD AUTO: 43.8 % (ref 41–52)
HGB BLD-MCNC: 14.9 G/DL (ref 13.5–17.5)
IMM GRANULOCYTES # BLD AUTO: 0.01 X10*3/UL (ref 0–0.7)
IMM GRANULOCYTES NFR BLD AUTO: 0.1 % (ref 0–0.9)
LYMPHOCYTES # BLD AUTO: 1.29 X10*3/UL (ref 1.2–4.8)
LYMPHOCYTES NFR BLD AUTO: 16.8 %
MCH RBC QN AUTO: 29.6 PG (ref 26–34)
MCHC RBC AUTO-ENTMCNC: 34 G/DL (ref 32–36)
MCV RBC AUTO: 87 FL (ref 80–100)
MONOCYTES # BLD AUTO: 0.62 X10*3/UL (ref 0.1–1)
MONOCYTES NFR BLD AUTO: 8.1 %
NEUTROPHILS # BLD AUTO: 5.52 X10*3/UL (ref 1.2–7.7)
NEUTROPHILS NFR BLD AUTO: 71.8 %
NRBC BLD-RTO: 0 /100 WBCS (ref 0–0)
PLATELET # BLD AUTO: 293 X10*3/UL (ref 150–450)
POTASSIUM SERPL-SCNC: 4.4 MMOL/L (ref 3.5–5.3)
PROT SERPL-MCNC: 7.2 G/DL (ref 6.4–8.2)
RBC # BLD AUTO: 5.04 X10*6/UL (ref 4.5–5.9)
SODIUM SERPL-SCNC: 139 MMOL/L (ref 136–145)
WBC # BLD AUTO: 7.7 X10*3/UL (ref 4.4–11.3)

## 2024-10-27 PROCEDURE — 2500000004 HC RX 250 GENERAL PHARMACY W/ HCPCS (ALT 636 FOR OP/ED)

## 2024-10-27 PROCEDURE — 82150 ASSAY OF AMYLASE: CPT

## 2024-10-27 PROCEDURE — 70491 CT SOFT TISSUE NECK W/DYE: CPT

## 2024-10-27 PROCEDURE — 36415 COLL VENOUS BLD VENIPUNCTURE: CPT

## 2024-10-27 PROCEDURE — 85025 COMPLETE CBC W/AUTO DIFF WBC: CPT

## 2024-10-27 PROCEDURE — 2550000001 HC RX 255 CONTRASTS

## 2024-10-27 PROCEDURE — 80053 COMPREHEN METABOLIC PANEL: CPT

## 2024-10-27 PROCEDURE — 70491 CT SOFT TISSUE NECK W/DYE: CPT | Performed by: RADIOLOGY

## 2024-10-27 PROCEDURE — 96374 THER/PROPH/DIAG INJ IV PUSH: CPT | Mod: 59

## 2024-10-27 PROCEDURE — 99284 EMERGENCY DEPT VISIT MOD MDM: CPT | Mod: 25

## 2024-10-27 RX ORDER — NAPROXEN SODIUM 550 MG/1
550 TABLET ORAL
Qty: 14 TABLET | Refills: 0 | Status: SHIPPED | OUTPATIENT
Start: 2024-10-27 | End: 2024-11-03

## 2024-10-27 RX ORDER — DOXYCYCLINE 100 MG/1
100 CAPSULE ORAL 2 TIMES DAILY
Qty: 20 CAPSULE | Refills: 0 | Status: SHIPPED | OUTPATIENT
Start: 2024-10-27 | End: 2024-11-06

## 2024-10-27 RX ORDER — KETOROLAC TROMETHAMINE 30 MG/ML
30 INJECTION, SOLUTION INTRAMUSCULAR; INTRAVENOUS ONCE
Status: COMPLETED | OUTPATIENT
Start: 2024-10-27 | End: 2024-10-27

## 2024-10-27 ASSESSMENT — COLUMBIA-SUICIDE SEVERITY RATING SCALE - C-SSRS
1. IN THE PAST MONTH, HAVE YOU WISHED YOU WERE DEAD OR WISHED YOU COULD GO TO SLEEP AND NOT WAKE UP?: NO
2. HAVE YOU ACTUALLY HAD ANY THOUGHTS OF KILLING YOURSELF?: NO
6. HAVE YOU EVER DONE ANYTHING, STARTED TO DO ANYTHING, OR PREPARED TO DO ANYTHING TO END YOUR LIFE?: NO

## 2024-10-27 ASSESSMENT — LIFESTYLE VARIABLES
EVER FELT BAD OR GUILTY ABOUT YOUR DRINKING: NO
HAVE PEOPLE ANNOYED YOU BY CRITICIZING YOUR DRINKING: NO
HAVE YOU EVER FELT YOU SHOULD CUT DOWN ON YOUR DRINKING: NO

## 2024-10-27 ASSESSMENT — PAIN SCALES - GENERAL
PAINLEVEL_OUTOF10: 5 - MODERATE PAIN
PAINLEVEL_OUTOF10: 10 - WORST POSSIBLE PAIN

## 2024-10-27 ASSESSMENT — PAIN - FUNCTIONAL ASSESSMENT: PAIN_FUNCTIONAL_ASSESSMENT: 0-10

## 2024-11-01 ENCOUNTER — HOSPITAL ENCOUNTER (EMERGENCY)
Facility: HOSPITAL | Age: 59
Discharge: HOME | End: 2024-11-01
Attending: EMERGENCY MEDICINE
Payer: COMMERCIAL

## 2024-11-01 VITALS
TEMPERATURE: 97.7 F | BODY MASS INDEX: 27.09 KG/M2 | RESPIRATION RATE: 16 BRPM | HEIGHT: 72 IN | DIASTOLIC BLOOD PRESSURE: 94 MMHG | OXYGEN SATURATION: 98 % | SYSTOLIC BLOOD PRESSURE: 146 MMHG | WEIGHT: 200 LBS | HEART RATE: 80 BPM

## 2024-11-01 DIAGNOSIS — K11.20 PAROTITIS: Primary | ICD-10-CM

## 2024-11-01 PROCEDURE — 96372 THER/PROPH/DIAG INJ SC/IM: CPT | Performed by: REGISTERED NURSE

## 2024-11-01 PROCEDURE — 2500000004 HC RX 250 GENERAL PHARMACY W/ HCPCS (ALT 636 FOR OP/ED): Performed by: REGISTERED NURSE

## 2024-11-01 PROCEDURE — 99283 EMERGENCY DEPT VISIT LOW MDM: CPT

## 2024-11-01 RX ORDER — KETOROLAC TROMETHAMINE 30 MG/ML
15 INJECTION, SOLUTION INTRAMUSCULAR; INTRAVENOUS ONCE
Status: DISCONTINUED | OUTPATIENT
Start: 2024-11-01 | End: 2024-11-01

## 2024-11-01 RX ORDER — PREDNISONE 20 MG/1
40 TABLET ORAL DAILY
Qty: 6 TABLET | Refills: 0 | Status: SHIPPED | OUTPATIENT
Start: 2024-11-01 | End: 2024-11-04

## 2024-11-01 RX ORDER — CLINDAMYCIN PHOSPHATE 600 MG/50ML
600 INJECTION, SOLUTION INTRAVENOUS ONCE
Status: DISCONTINUED | OUTPATIENT
Start: 2024-11-01 | End: 2024-11-01

## 2024-11-01 RX ORDER — AMOXICILLIN AND CLAVULANATE POTASSIUM 875; 125 MG/1; MG/1
1 TABLET, FILM COATED ORAL EVERY 12 HOURS
Qty: 20 TABLET | Refills: 0 | Status: SHIPPED | OUTPATIENT
Start: 2024-11-01 | End: 2024-11-11

## 2024-11-01 RX ORDER — ONDANSETRON 4 MG/1
4 TABLET, ORALLY DISINTEGRATING ORAL EVERY 8 HOURS PRN
Qty: 20 TABLET | Refills: 0 | Status: SHIPPED | OUTPATIENT
Start: 2024-11-01 | End: 2024-11-08

## 2024-11-01 RX ORDER — KETOROLAC TROMETHAMINE 30 MG/ML
30 INJECTION, SOLUTION INTRAMUSCULAR; INTRAVENOUS ONCE
Status: COMPLETED | OUTPATIENT
Start: 2024-11-01 | End: 2024-11-01

## 2024-11-01 ASSESSMENT — LIFESTYLE VARIABLES
EVER FELT BAD OR GUILTY ABOUT YOUR DRINKING: NO
TOTAL SCORE: 0
HAVE PEOPLE ANNOYED YOU BY CRITICIZING YOUR DRINKING: NO
EVER HAD A DRINK FIRST THING IN THE MORNING TO STEADY YOUR NERVES TO GET RID OF A HANGOVER: NO
HAVE YOU EVER FELT YOU SHOULD CUT DOWN ON YOUR DRINKING: NO

## 2024-11-01 ASSESSMENT — PAIN - FUNCTIONAL ASSESSMENT: PAIN_FUNCTIONAL_ASSESSMENT: 0-10

## 2024-11-01 ASSESSMENT — COLUMBIA-SUICIDE SEVERITY RATING SCALE - C-SSRS
2. HAVE YOU ACTUALLY HAD ANY THOUGHTS OF KILLING YOURSELF?: NO
6. HAVE YOU EVER DONE ANYTHING, STARTED TO DO ANYTHING, OR PREPARED TO DO ANYTHING TO END YOUR LIFE?: NO
1. IN THE PAST MONTH, HAVE YOU WISHED YOU WERE DEAD OR WISHED YOU COULD GO TO SLEEP AND NOT WAKE UP?: NO

## 2024-11-01 ASSESSMENT — PAIN SCALES - GENERAL: PAINLEVEL_OUTOF10: 10 - WORST POSSIBLE PAIN

## 2024-11-11 ENCOUNTER — OFFICE VISIT (OUTPATIENT)
Dept: OTOLARYNGOLOGY | Facility: CLINIC | Age: 59
End: 2024-11-11
Payer: COMMERCIAL

## 2024-11-11 VITALS — TEMPERATURE: 95.9 F | BODY MASS INDEX: 27.12 KG/M2 | WEIGHT: 200 LBS

## 2024-11-11 DIAGNOSIS — K11.20 PAROTITIS: ICD-10-CM

## 2024-11-11 DIAGNOSIS — K11.20 SIALADENITIS: Primary | ICD-10-CM

## 2024-11-11 PROCEDURE — 99204 OFFICE O/P NEW MOD 45 MIN: CPT | Performed by: STUDENT IN AN ORGANIZED HEALTH CARE EDUCATION/TRAINING PROGRAM

## 2024-11-11 PROCEDURE — 99214 OFFICE O/P EST MOD 30 MIN: CPT | Performed by: STUDENT IN AN ORGANIZED HEALTH CARE EDUCATION/TRAINING PROGRAM

## 2024-11-11 RX ORDER — ONDANSETRON 4 MG/1
4 TABLET, ORALLY DISINTEGRATING ORAL EVERY 8 HOURS PRN
Qty: 20 TABLET | Refills: 0 | Status: SHIPPED | OUTPATIENT
Start: 2024-11-11 | End: 2024-11-18

## 2024-11-11 RX ORDER — GABAPENTIN 100 MG/1
300 CAPSULE ORAL NIGHTLY
Qty: 42 CAPSULE | Refills: 0 | Status: SHIPPED | OUTPATIENT
Start: 2024-11-11 | End: 2024-11-25

## 2024-11-11 RX ORDER — DEXTROMETHORPHAN HYDROBROMIDE, GUAIFENESIN 5; 100 MG/5ML; MG/5ML
650 LIQUID ORAL EVERY 8 HOURS PRN
Qty: 30 TABLET | Refills: 0 | Status: SHIPPED | OUTPATIENT
Start: 2024-11-11 | End: 2024-11-21

## 2024-11-11 RX ORDER — IBUPROFEN 800 MG/1
800 TABLET ORAL 3 TIMES DAILY
Qty: 30 TABLET | Refills: 0 | Status: SHIPPED | OUTPATIENT
Start: 2024-11-11 | End: 2024-11-21

## 2024-11-11 ASSESSMENT — PATIENT HEALTH QUESTIONNAIRE - PHQ9
2. FEELING DOWN, DEPRESSED OR HOPELESS: NOT AT ALL
1. LITTLE INTEREST OR PLEASURE IN DOING THINGS: NOT AT ALL
SUM OF ALL RESPONSES TO PHQ9 QUESTIONS 1 AND 2: 0

## 2024-11-11 NOTE — PROGRESS NOTES
HEAD AND NECK NEW PATIENT NOTE    Today I had the pleasure of seeing Lex Lemus at the request of Toi HERNANDEZ  The patient states that the  gland started swelling about one month ago. He went to the emergency room and was given steroids and antibiotics. He is still taking the augmentin and steroids, has a few days left. He is continuing to have severe pain. No hospitalizations have been required. Prior imaging performed did not uncover any masses or lesions. No prior salivary surgery noted. No foul taste or dry mouth.    Patient drinks multiple glasses of water per day, has not been doing warm compresses, massage or sialogogues consistently due to the pain. Smokes cigars. No prior surgery to the head and neck.    No past medical history on file.   Past Surgical History:   Procedure Laterality Date    OTHER SURGICAL HISTORY  2021    Knee surgery    OTHER SURGICAL HISTORY  2021    Elbow surgery    OTHER SURGICAL HISTORY  2021    Oral surgery    OTHER SURGICAL HISTORY  2021    Knee replacement    OTHER SURGICAL HISTORY  2021    Cyst excision    OTHER SURGICAL HISTORY  2021    Inguinal hernia repair      No family history on file.  Current Outpatient Medications on File Prior to Visit   Medication Sig Dispense Refill    amoxicillin-pot clavulanate (Augmentin) 875-125 mg tablet Take 1 tablet by mouth every 12 hours for 10 days. 20 tablet 0    [] doxycycline (Vibramycin) 100 mg capsule Take 1 capsule (100 mg) by mouth 2 times a day for 10 days. Take with at least 8 ounces (large glass) of water, do not lie down for 30 minutes after 20 capsule 0    [] ondansetron ODT (Zofran-ODT) 4 mg disintegrating tablet Take 1 tablet (4 mg) by mouth every 8 hours if needed for nausea or vomiting for up to 7 days. 20 tablet 0    [] predniSONE (Deltasone) 20 mg tablet Take 2 tablets (40 mg) by mouth once daily for 3 days. 6 tablet 0     No current facility-administered  medications on file prior to visit.      Allergies   Allergen Reactions    Bee Venom Protein (Honey Bee) Anaphylaxis       Social History     Tobacco Use   Smoking Status Some Days    Types: Cigars   Smokeless Tobacco Never     Social History     Substance and Sexual Activity   Alcohol Use Never       PHYSICAL EXAM:  Vitals:    11/11/24 0825   Temp: 35.5 °C (95.9 °F)   Weight: 90.7 kg (200 lb)     GENERAL: Patient is awake, non-toxic appearing, and in no acute distress. Voice is strong without hoarseness or stridor.   SKIN: No overtly ulcerated, cellulitic, or indurated lesions of the head or neck.  HEAD: Normocephalic and atraumatic.  Sinuses are non-tender to palpation.  EARS: The pinnas are well-formed. Ear canals are clear of cerumen.  Tympanic membranes are intact without effusion or perforation  EYES: Extraocular muscles are intact. The sclera and conjunctiva are normal. No ptosis is appreciated.    NOSE: The nasal dorsum is without scar or deformity.  No mucopurulence or polyps are appreciated.  The nasal airways are patent bilaterally.  ORAL CAVITY: All 4 major salivary glands were examined. All four glands have good salivary flow through the ducts. The left submandibular gland is exquisitely tender, not enlarged.  OROPHARYNX: No mucosal masses or lesions are appreciated.  The uvula is midline  NECK: The neck is soft and supple. No crepitus,masses, or lymphadenopathy are appreciated.  The trachea is in midline.  NEUROLOGICAL: Cranial nerves II through VII and 9 through 12 are intact bilaterally. Gait intact.  PSYCHIATRIC: Alert and oriented ×3. Mood and affect are otherwise appropriate.    IMAGING:  I personally reviewed the imaging: CT neck with contrast 10/27/24, relatively unremarkable without salivary stone, mass or significant fat stranding or inflammation    CLINIC NOTES: I personally reviewed the ED note from 11/1/24 by Coreen Christensen MD, noted swelling and pain associated with the salivary gland,  recommended conservative management, dc'd on steroids and augmentin    ASSESSMENT AND PLAN:   Sialadenitis  - Discussed exam and imaging at length with patient. He is getting salivary flow and no stone or mass identified in the left submandibular gland on imaging  - Would not pursue surgical intervention at this time given recent infection  - Recommend completion of the antibiotic and steroids, pain control with alternating tylenol/ibuprofen, hydration, massage and warm compresses. Counseled on the use of sialogogues to stimulate salivary flow  - Will see him back in a few weeks for re-examination, if symptoms continue or worsen can consider US of salivary glands     Mana Mcknight MD  Otolaryngology, Head and Neck Surgery

## 2024-12-09 ENCOUNTER — OFFICE VISIT (OUTPATIENT)
Dept: OTOLARYNGOLOGY | Facility: CLINIC | Age: 59
End: 2024-12-09
Payer: COMMERCIAL

## 2024-12-09 VITALS — TEMPERATURE: 97.5 F | HEIGHT: 72 IN | BODY MASS INDEX: 27.52 KG/M2 | WEIGHT: 203.2 LBS

## 2024-12-09 DIAGNOSIS — M26.609 TEMPOROMANDIBULAR JOINT DISORDER: Primary | ICD-10-CM

## 2024-12-09 DIAGNOSIS — K11.20 SIALADENITIS: ICD-10-CM

## 2024-12-09 PROCEDURE — 3008F BODY MASS INDEX DOCD: CPT | Performed by: STUDENT IN AN ORGANIZED HEALTH CARE EDUCATION/TRAINING PROGRAM

## 2024-12-09 PROCEDURE — 99213 OFFICE O/P EST LOW 20 MIN: CPT | Performed by: STUDENT IN AN ORGANIZED HEALTH CARE EDUCATION/TRAINING PROGRAM

## 2024-12-09 ASSESSMENT — PAIN SCALES - GENERAL: PAINLEVEL_OUTOF10: 9

## 2024-12-09 NOTE — PROGRESS NOTES
HEAD AND NECK RETURN PATIENT NOTE    Today I had the pleasure of seeing Lex Lemus as a follow up.    HPI  Patient returns for follow up. History of left submandibular gland swelling, seen in ED and prescribed augmentin and steroids. CT scan did not show any masses or stones.     He returns today for follow up, has completed the antibiotics and steroids. Continues to complain of severe pain. Pain is located along his left SCM and his ear. His left jaw has been popping out of place, he had to put it back in. Having pain with chewing, Not wearing upper dentures.    No past medical history on file.   Past Surgical History:   Procedure Laterality Date    OTHER SURGICAL HISTORY  01/07/2021    Knee surgery    OTHER SURGICAL HISTORY  01/07/2021    Elbow surgery    OTHER SURGICAL HISTORY  01/07/2021    Oral surgery    OTHER SURGICAL HISTORY  01/07/2021    Knee replacement    OTHER SURGICAL HISTORY  01/07/2021    Cyst excision    OTHER SURGICAL HISTORY  01/28/2021    Inguinal hernia repair      No family history on file.  Current Outpatient Medications on File Prior to Visit   Medication Sig Dispense Refill    gabapentin (Neurontin) 100 mg capsule Take 3 capsules (300 mg) by mouth once daily at bedtime for 14 days. (Patient not taking: Reported on 12/9/2024) 42 capsule 0     No current facility-administered medications on file prior to visit.      Allergies   Allergen Reactions    Bee Venom Protein (Honey Bee) Anaphylaxis       Social History     Tobacco Use   Smoking Status Some Days    Types: Cigars   Smokeless Tobacco Never     Social History     Substance and Sexual Activity   Alcohol Use Never       PHYSICAL EXAM:  Vitals:    12/09/24 0804   Temp: 36.4 °C (97.5 °F)   Weight: 92.2 kg (203 lb 3.2 oz)   Height: 1.829 m (6')     GENERAL: Patient is awake, non-toxic appearing, and in no acute distress. Voice is strong without hoarseness or stridor.   SKIN: No overtly ulcerated, cellulitic, or indurated lesions of the  head or neck.  HEAD: Normocephalic and atraumatic.  Sinuses are non-tender to palpation.  EARS: The pinnas are well-formed. Ear canals are clear of cerumen.  Tympanic membranes are intact without effusion or perforation  EYES: Extraocular muscles are intact. The sclera and conjunctiva are normal. No ptosis is appreciated.    NOSE: The nasal dorsum is without scar or deformity.  No mucopurulence or polyps are appreciated.  The nasal airways are patent bilaterally.  ORAL CAVITY: All 4 major salivary glands were examined. All four glands have good salivary flow through the ducts. The left submandibular gland is not enlarged, nontende. No mass or lesion of the oral cavity, particular attention to upper gingiva where dentures would sit and no lesion identified  OROPHARYNX: No mucosal masses or lesions are appreciated.  The uvula is midline  NECK: The neck is soft and supple. No crepitus,masses, or lymphadenopathy are appreciated.  The trachea is in midline. Exquisitely tender over left SCM and left TMJ joint  NEUROLOGICAL: Cranial nerves II through VII and 9 through 12 are intact bilaterally. Gait intact.  PSYCHIATRIC: Alert and oriented ×3. Mood and affect are otherwise appropriate.    IMAGING:  I personally reviewed the imaging: CT neck with contrast 10/27/24, relatively unremarkable without salivary stone, mass or significant fat stranding or inflammation    CLINIC NOTES: I personally reviewed the ED note from 11/1/24 by Coreen Christensen MD, noted swelling and pain associated with the salivary gland, recommended conservative management, dc'd on steroids and augmentin    ASSESSMENT AND PLAN:   Sialadenitis - resolved  TMJ arthralgia, left  Cervicialgia  - Discussed exam and imaging at length with patient.   His pain is out of proportion from what I would expect, and is not localized to the submandibular gland. The gland itself is normal in size and palpation today. Reviewed again imaging which did not show a stone or  mass, good salivary flow from the ducts today. Will order ultrasound to confirm no continued issues with the glands  - Reviewed pain control of musculoskeletal related pain, including warm compress, tylenol and ibuprofen. If additional pain control is warranted can consider referral to pain specialist.   - Referral placed to OMFS for left TMJ arthralgia     Mana Mcknight MD  Otolaryngology, Head and Neck Surgery

## (undated) DEVICE — SINGLE PORT MANIFOLD: Brand: NEPTUNE 2

## (undated) DEVICE — DRAPE,U/ SHT,SPLIT,PLAS,STERIL: Brand: MEDLINE

## (undated) DEVICE — SPONGE,LAP,18"X18",DLX,XR,ST,5/PK,40/PK: Brand: MEDLINE

## (undated) DEVICE — APPLICATOR MEDICATED 26 CC SOLUTION HI LT ORNG CHLORAPREP

## (undated) DEVICE — COUNTER NDL 40 COUNT HLD 70 FOAM BLK ADH W/ MAG

## (undated) DEVICE — PADDING CAST W6INXL4YD POLY POR SPUN DACRON NON STERILE

## (undated) DEVICE — PACK PROCEDURE SURG TOTAL KNEE PACK

## (undated) DEVICE — MARKER SURG SKIN GENTIAN VLT REG TIP W/ 6IN RUL DYNJSM01

## (undated) DEVICE — ELECTRODE ES L275IN 275IN BLDE TIP COAT PTFE TEF W EVAC

## (undated) DEVICE — SUTURE ETHBND EXCEL SZ 1 L30IN NONABSORBABLE GRN L48MM CTX X865H

## (undated) DEVICE — SUTURE PROL SZ 0 L30IN NONABSORBABLE BLU L36MM CT-1 1/2 CIR 8424H

## (undated) DEVICE — SYRINGE MED 50ML LUERLOCK TIP

## (undated) DEVICE — 4-PORT MANIFOLD: Brand: NEPTUNE 2

## (undated) DEVICE — GLOVE ORANGE PI 8 1/2   MSG9085

## (undated) DEVICE — SUTURE VCRL SZ 2-0 L27IN ABSRB UD L26MM SH 1/2 CIR J417H

## (undated) DEVICE — DRESSING HYDROFIBER AQUACEL AG ADVANTAGE 3.5X14 IN

## (undated) DEVICE — SUTURE VCRL SZ 2-0 L36IN ABSRB UD L36MM CT-1 1/2 CIR J945H

## (undated) DEVICE — TUBING, SUCTION, 9/32" X 12', STRAIGHT: Brand: MEDLINE INDUSTRIES, INC.

## (undated) DEVICE — YANKAUER,BULB TIP,W/O VENT,RIGID,STERILE: Brand: MEDLINE

## (undated) DEVICE — PADDING CAST W6INXL4YD RAYON UNDERCAST SOF-ROL

## (undated) DEVICE — PACK,LAPAROTOMY,NO GOWNS: Brand: MEDLINE

## (undated) DEVICE — TOWEL,OR,DSP,ST,BLUE,STD,4/PK,20PK/CS: Brand: MEDLINE

## (undated) DEVICE — SKIN PREP TRAY 4 COMPARTM TRAY: Brand: MEDLINE INDUSTRIES, INC.

## (undated) DEVICE — GLOVE SURG SZ 9 THK91MIL LTX FREE SYN POLYISOPRENE ANTI

## (undated) DEVICE — LABEL MED MINI W/ MARKER

## (undated) DEVICE — T-DRAPE,EXTREMITY,STERILE: Brand: MEDLINE

## (undated) DEVICE — NEPTUNE E-SEP SMOKE EVACUATION PENCIL, COATED, 70MM BLADE, PUSH BUTTON SWITCH: Brand: NEPTUNE E-SEP

## (undated) DEVICE — Device: Brand: STABLECUT®

## (undated) DEVICE — GOWN,AURORA,NONREINFORCED,LARGE: Brand: MEDLINE

## (undated) DEVICE — DRESSING HYDROFIBER AQUACEL AG ADVANTAGE 3.5X12 IN

## (undated) DEVICE — SUTURE VCRL SZ 3-0 L27IN ABSRB UD L26MM SH 1/2 CIR J416H

## (undated) DEVICE — SUTURE VCRL SZ 1 L36IN ABSRB UD L36MM CT-1 1/2 CIR J947H

## (undated) DEVICE — STRIP,CLOSURE,WOUND,MEDI-STRIP,1/2X4: Brand: MEDLINE

## (undated) DEVICE — COVER LT HNDL BLU PLAS

## (undated) DEVICE — NEEDLE SPNL 20GA L3 1 2IN YEL S STL POLYCARB HUB MTL STYL

## (undated) DEVICE — SYRINGE IRRIG 60ML SFT PLIABLE BLB EZ TO GRP 1 HND USE W/

## (undated) DEVICE — SUTURE STRATAFIX SPRL MCRYL + SZ 3-0 L18IN ABSRB UD PS-2 SXMP1B107

## (undated) DEVICE — HOOD: Brand: T7PLUS

## (undated) DEVICE — DRAIN SURG PENROSE 0.25X12 IN CLOSED WND DRAINAGE PREM SIL

## (undated) DEVICE — GLOVE ORANGE PI 7 1/2   MSG9075

## (undated) DEVICE — BANDAGE COMPR M W6INXL10YD WHT BGE VELC E MTRX HK AND LOOP

## (undated) DEVICE — GOWN,SIRUS,POLYRNF,BRTHSLV,XLN/XXL,18/CS: Brand: MEDLINE

## (undated) DEVICE — GOWN,AURORA,NONRNF,XL,30/CS: Brand: MEDLINE

## (undated) DEVICE — SUTURE MCRYL SZ 2-0 L36IN ABSRB UD L36MM CT-1 1/2 CIR Y945H

## (undated) DEVICE — BANDAGE COBAN 6 IN WND 6INX5YD FOAM

## (undated) DEVICE — SUTURE ABSORBABLE BRAIDED 0 CT-1 8X27 IN UD VICRYL JJ41G

## (undated) DEVICE — GLOVE ORANGE PI 8   MSG9080

## (undated) DEVICE — ELECTRODE PT RET AD L9FT HI MOIST COND ADH HYDRGEL CORDED

## (undated) DEVICE — GAUZE,SPONGE,FLUFF,6"X6.75",STRL,10/TRAY: Brand: MEDLINE

## (undated) DEVICE — HIGH CAPACITY INTRAMEDULLARY BRUSH TIP: Brand: PULSAVAC®

## (undated) DEVICE — 3M™ STERI-DRAPE™ U-DRAPE 1015: Brand: STERI-DRAPE™

## (undated) DEVICE — SUTURE MCRYL SZ 4-0 L27IN ABSRB UD L19MM PS-2 1/2 CIR PRIM Y426H